# Patient Record
Sex: FEMALE | Race: WHITE | NOT HISPANIC OR LATINO | Employment: OTHER | URBAN - METROPOLITAN AREA
[De-identification: names, ages, dates, MRNs, and addresses within clinical notes are randomized per-mention and may not be internally consistent; named-entity substitution may affect disease eponyms.]

---

## 2022-12-15 ENCOUNTER — OFFICE VISIT (OUTPATIENT)
Dept: FAMILY MEDICINE CLINIC | Facility: CLINIC | Age: 62
End: 2022-12-15

## 2022-12-15 ENCOUNTER — TELEPHONE (OUTPATIENT)
Dept: FAMILY MEDICINE CLINIC | Facility: CLINIC | Age: 62
End: 2022-12-15

## 2022-12-15 VITALS
WEIGHT: 170 LBS | TEMPERATURE: 97.4 F | HEART RATE: 67 BPM | SYSTOLIC BLOOD PRESSURE: 126 MMHG | OXYGEN SATURATION: 94 % | HEIGHT: 67 IN | BODY MASS INDEX: 26.68 KG/M2 | DIASTOLIC BLOOD PRESSURE: 74 MMHG

## 2022-12-15 DIAGNOSIS — Z00.00 HEALTH CARE MAINTENANCE: ICD-10-CM

## 2022-12-15 DIAGNOSIS — I10 ESSENTIAL HYPERTENSION: ICD-10-CM

## 2022-12-15 DIAGNOSIS — I48.91 ATRIAL FIBRILLATION WITH RAPID VENTRICULAR RESPONSE (HCC): ICD-10-CM

## 2022-12-15 DIAGNOSIS — I63.9 CEREBELLAR STROKE (HCC): ICD-10-CM

## 2022-12-15 DIAGNOSIS — I61.4 RIGHT-SIDED NONTRAUMATIC INTRACEREBRAL HEMORRHAGE OF CEREBELLUM (HCC): ICD-10-CM

## 2022-12-15 DIAGNOSIS — Z00.00 WELL ADULT EXAM: Primary | ICD-10-CM

## 2022-12-15 PROBLEM — I47.29 NSVT (NONSUSTAINED VENTRICULAR TACHYCARDIA): Status: ACTIVE | Noted: 2022-11-17

## 2022-12-15 PROBLEM — I44.7 LBBB (LEFT BUNDLE BRANCH BLOCK): Status: ACTIVE | Noted: 2022-11-13

## 2022-12-15 PROBLEM — E87.6 HYPOKALEMIA: Status: ACTIVE | Noted: 2022-11-13

## 2022-12-15 RX ORDER — LOSARTAN POTASSIUM 50 MG/1
TABLET ORAL
COMMUNITY
Start: 2022-11-28 | End: 2022-12-16 | Stop reason: SDUPTHER

## 2022-12-15 RX ORDER — VENLAFAXINE 75 MG/1
75 TABLET ORAL DAILY
Qty: 90 TABLET | Refills: 3 | Status: SHIPPED | OUTPATIENT
Start: 2022-12-15

## 2022-12-15 RX ORDER — METOPROLOL TARTRATE 50 MG/1
TABLET, FILM COATED ORAL
COMMUNITY
Start: 2022-11-28 | End: 2022-12-16 | Stop reason: SDUPTHER

## 2022-12-15 NOTE — TELEPHONE ENCOUNTER
Patient stated that the following medications were to be ordered for her, as well:    Losartan  Metoprolol Tartrate    Ph[facility]acy: [facility] in [place]

## 2022-12-16 DIAGNOSIS — I10 ESSENTIAL HYPERTENSION: Primary | ICD-10-CM

## 2022-12-16 RX ORDER — METOPROLOL TARTRATE 50 MG/1
50 TABLET, FILM COATED ORAL EVERY 12 HOURS SCHEDULED
Qty: 180 TABLET | Refills: 3 | Status: SHIPPED | OUTPATIENT
Start: 2022-12-16

## 2022-12-16 RX ORDER — LOSARTAN POTASSIUM 50 MG/1
50 TABLET ORAL DAILY
Qty: 90 TABLET | Refills: 3 | Status: SHIPPED | OUTPATIENT
Start: 2022-12-16

## 2022-12-16 NOTE — TELEPHONE ENCOUNTER
Called and informed patient's daughter that two medications were sent to [address] [place] [address]

## 2022-12-22 NOTE — PROGRESS NOTES
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 ([facility] )    3  Atrial fibrillation with rapid ventricular response (HCC)    4  Cerebellar stroke ([facility] )    5  Essential hypertension  -     CBC and differential  -     Comprehensive metabolic panel    6  Health care maintenance  -     venlafaxine (EFFEXOR) 75 mg tablet; Take 1 tablet (75 mg total) by mouth daily          Subjective:      Patient ID: Urban Dave is a 65 y o  female  Patient is here today to reestablish and for well check  She does have history of recent stroke and is currently seeing physical therapist   She had some mild right-sided weakness  She does have history of atrial fibrillation  Rate is controlled  Denies any recent palpitations chest pain or shortness of breath  The following portions of the patient's history were reviewed and updated as appropriate: allergies, current medications, past family history, past medical history, past social history, past surgical history, and problem list     Review of Systems   Constitutional: Negative  HENT: Negative  Eyes: Negative  Respiratory: Negative  Cardiovascular: Negative  Gastrointestinal: Negative  Endocrine: Negative  Genitourinary: Negative  Musculoskeletal: Negative  Skin: Negative  Allergic/Immunologic: Negative  Neurological:        As noted in HPI   Hematological: Negative  Psychiatric/Behavioral: Negative            Objective:      /74 (BP Location: Left arm, Patient Position: Sitting, Cuff Size: Standard)   Pulse 67   Temp (!) 97 4 °F (36 3 °C) (Temporal)  5' 7" (1 702 m)   Wt 77 1 kg (170 lb)   SpO2 94%   BMI 26 63 kg/m²          Physical Exam  Vitals reviewed  Constitutional:       Appearance: She is well-developed  HENT:      Head: Normocephalic and atraumatic  Right Ear: External ear normal  Tympanic membrane is not erythematous or bulging  Left Ear: External ear normal  Tympanic membrane is not erythematous or bulging  Nose: Nose normal       Mouth/Throat:      Mouth: No oral lesions  Pharynx: No oropharyngeal exudate  Eyes:      General: No scleral icterus  Right eye: No discharge  Left eye: No discharge  Conjunctiva/sclera: Conjunctivae normal    Neck:      Thyroid: No thyromegaly  Cardiovascular:      Rate and Rhythm: Normal rate and regular rhythm  Heart sounds: Normal heart sounds  No murmur heard  No friction rub  No gallop  Pulmonary:      Effort: Pulmonary effort is normal  No respiratory distress  Breath sounds: No wheezing or rales  Chest:      Chest wall: No tenderness  Abdominal:      General: Bowel sounds are normal  There is no distension  Palpations: Abdomen is soft  There is no mass  Tenderness: There is no abdominal tenderness  There is no guarding or rebound  Musculoskeletal:         General: No tenderness or deformity  Normal range of motion  Cervical back: Normal range of motion and neck supple  Comments: Mild right upper extremity weakness compared to the left  Gait generally intact  No speech difficulty  Lymphadenopathy:      Cervical: No cervical adenopathy  Skin:     General: Skin is warm and dry  Coloration: Skin is not pale  Findings: No erythema or rash  Neurological:      Mental Status: She is alert and oriented to person, place, and time  Cranial Nerves: No cranial nerve deficit  Motor: No abnormal muscle tone  Coordination: Coordination normal       Deep Tendon Reflexes: Reflexes are normal and symmetric  Psychiatric:         Behavior: Behavior normal

## 2023-01-26 ENCOUNTER — TELEPHONE (OUTPATIENT)
Dept: FAMILY MEDICINE CLINIC | Facility: CLINIC | Age: 63
End: 2023-01-26

## 2023-01-26 ENCOUNTER — OFFICE VISIT (OUTPATIENT)
Dept: FAMILY MEDICINE CLINIC | Facility: CLINIC | Age: 63
End: 2023-01-26

## 2023-01-26 VITALS
BODY MASS INDEX: 26.37 KG/M2 | SYSTOLIC BLOOD PRESSURE: 144 MMHG | DIASTOLIC BLOOD PRESSURE: 92 MMHG | HEIGHT: 67 IN | WEIGHT: 168 LBS | HEART RATE: 66 BPM | OXYGEN SATURATION: 98 % | TEMPERATURE: 96.9 F

## 2023-01-26 DIAGNOSIS — I63.9 CEREBELLAR STROKE (HCC): ICD-10-CM

## 2023-01-26 DIAGNOSIS — I25.42 CORONARY ARTERY DISSECTION: ICD-10-CM

## 2023-01-26 DIAGNOSIS — I10 ESSENTIAL HYPERTENSION: Primary | ICD-10-CM

## 2023-01-26 DIAGNOSIS — I48.91 ATRIAL FIBRILLATION WITH RAPID VENTRICULAR RESPONSE (HCC): ICD-10-CM

## 2023-01-26 NOTE — PROGRESS NOTES
Assessment/Plan: Patient has not yet seen neurology but has followed up with cardiology  I recommended that she check home blood pressure readings daily for the next few weeks and return to office for recheck on blood pressure along with blood pressure readings recorded at home  I also recommended that she follow-up with neurology as she has not yet done so following her hospitalization  Referral order provided  She will call with any new persisting or worsening symptoms  Continue therapy as tolerated  1  Essential hypertension  -     Blood Pressure Monitoring (Blood Pressure Kit) VIKTOR; Use 1 Device daily    2  Atrial fibrillation with rapid ventricular response ([address] )    3  Cerebellar stroke [facility])  -     Ambulatory referral to Neurology; Future    4  Coronary artery dissection  -     Blood Pressure Monitoring (Blood Pressure Kit) VIKTOR; Use 1 Device daily          Subjective:      Patient ID: Bernice Anglin is a 65 y o  female  Patient with history of intracerebral hemorrhage in November is seen today for recheck  She is here with   She continues to do physical and occupational therapy  She states she feels like she is not improving and still has continued positional dizziness  Blood pressure today was elevated at physical therapy but today on the office is close to being normotensive  She continues on losartan and Lopressor as noted in med list   She also takes venlafaxine  The following portions of the patient's history were reviewed and updated as appropriate: allergies, current medications, past family history, past medical history, past social history, past surgical history, and problem list     Review of Systems   Constitutional: Negative  HENT: Negative  Eyes: Negative  Respiratory: Negative  Cardiovascular: Negative  Gastrointestinal: Negative  Endocrine: Negative  Genitourinary: Negative  Musculoskeletal: Negative  Skin: Negative  Allergic/Immunologic: Negative  Neurological: Positive for dizziness  Hematological: Negative  Psychiatric/Behavioral: Negative  Objective:      Pulse 66   Temp (!) 96 9 °F (36 1 °C) (Temporal)   Ht 5' 7" (1 702 m)   Wt 76 2 kg (168 lb)   SpO2 98%   BMI 26 31 kg/m²          Physical Exam  Vitals reviewed  Constitutional:       Appearance: She is well-developed  HENT:      Head: Normocephalic and atraumatic  Right Ear: External ear normal  Tympanic membrane is not erythematous or bulging  Left Ear: External ear normal  Tympanic membrane is not erythematous or bulging  Nose: Nose normal       Mouth/Throat:      Mouth: No oral lesions  Pharynx: No oropharyngeal exudate  Eyes:      General: No scleral icterus  Right eye: No discharge  Left eye: No discharge  Conjunctiva/sclera: Conjunctivae normal    Neck:      Thyroid: No thyromegaly  Cardiovascular:      Rate and Rhythm: Normal rate and regular rhythm  Heart sounds: Normal heart sounds  No murmur heard  No friction rub  No gallop  Pulmonary:      Effort: Pulmonary effort is normal  No respiratory distress  Breath sounds: No wheezing or rales  Chest:      Chest wall: No tenderness  Abdominal:      General: Bowel sounds are normal  There is no distension  Palpations: Abdomen is soft  There is no mass  Tenderness: There is no abdominal tenderness  There is no guarding or rebound  Musculoskeletal:         General: No tenderness or deformity  Normal range of motion  Cervical back: Normal range of motion and neck supple  Lymphadenopathy:      Cervical: No cervical adenopathy  Skin:     General: Skin is warm and dry  Coloration: Skin is not pale  Findings: No erythema or rash  Neurological:      Mental Status: She is alert and oriented to person, place, and time  Cranial Nerves: No cranial nerve deficit  Motor: No abnormal muscle tone  Coordination: Coordination normal       Gait: Gait abnormal (Gait continues to be a bit unsteady  )  Deep Tendon Reflexes: Reflexes are normal and symmetric     Psychiatric:         Behavior: Behavior normal

## 2023-01-26 NOTE — TELEPHONE ENCOUNTER
Per Dr Lia Partida, patient needs to follow up with neurology  When the patient was checking out, I told her I would reach out to them for an appointment and call her back with the info  I called [address] [place] [address] Neurology and gave patient info  The woman I spoke with said she will reach out to the patient directly because they are scheduling new patients out to July  She will speak with her and see what is going on so they can correctly triage her  Their physicians will let her know how soon the patient needs to be seen so they can get her in

## 2023-01-26 NOTE — TELEPHONE ENCOUNTER
Received a call from [address] [place] [address] [facility] ) stating they did not do her physical therapy today due to her elevated blood pressure   They wanted us to inform you of the readings:    170/101  180/100  175/95  170/101

## 2023-01-27 ENCOUNTER — TELEPHONE (OUTPATIENT)
Dept: NEUROLOGY | Facility: CLINIC | Age: 63
End: 2023-01-27

## 2023-01-27 NOTE — TELEPHONE ENCOUNTER
Voicemail received from daughter Nick Page, awaiting call back regarding NP appointment  States still waiting to hear about scheduling with our office  Patients at home BP's are reading 210/116  Per daughter, PCP will not make any medication changes until seen by neurology  This is a new patient  Patient was in [facility] for stroke  Call returned to patient/daughter  Spoke to patient  Patient states she has a lot on her mind (stress, illness, family issues)  Symptoms have worsened once she had stroke  BP at PCP office 1/26/2023 144/92  Todays /116 along with multiple readings of errors on cuff  Advised patient we are awaiting triage response from provider (which can take up to a week) BUT, if BP's remain consistently elevated or accompanied by symptoms of headache, visual disturbances, numbness, tingling, etc  These are symptoms that should prompt more urgent attention/evaluation in ED  Patient verbalized understanding  Advised patient if daughter has additional follow up questions she can certainly call office back

## 2023-02-03 ENCOUNTER — VBI (OUTPATIENT)
Dept: ADMINISTRATIVE | Facility: OTHER | Age: 63
End: 2023-02-03

## 2023-02-14 ENCOUNTER — HOSPITAL ENCOUNTER (EMERGENCY)
Facility: HOSPITAL | Age: 63
Discharge: HOME | End: 2023-02-14
Attending: EMERGENCY MEDICINE

## 2023-02-14 ENCOUNTER — CONSULT (OUTPATIENT)
Dept: NEUROLOGY | Facility: CLINIC | Age: 63
End: 2023-02-14

## 2023-02-14 ENCOUNTER — APPOINTMENT (EMERGENCY)
Dept: RADIOLOGY | Facility: HOSPITAL | Age: 63
End: 2023-02-14

## 2023-02-14 VITALS
HEART RATE: 62 BPM | DIASTOLIC BLOOD PRESSURE: 93 MMHG | TEMPERATURE: 97.3 F | OXYGEN SATURATION: 95 % | RESPIRATION RATE: 14 BRPM | SYSTOLIC BLOOD PRESSURE: 185 MMHG

## 2023-02-14 VITALS
BODY MASS INDEX: 26.72 KG/M2 | SYSTOLIC BLOOD PRESSURE: 202 MMHG | WEIGHT: 170.6 LBS | TEMPERATURE: 96.8 F | DIASTOLIC BLOOD PRESSURE: 110 MMHG

## 2023-02-14 DIAGNOSIS — R03.0 ELEVATED BLOOD PRESSURE READING: ICD-10-CM

## 2023-02-14 DIAGNOSIS — R51.9 ACUTE HEADACHE: Primary | ICD-10-CM

## 2023-02-14 DIAGNOSIS — Z86.79 HISTORY OF CEREBRAL HEMORRHAGE: ICD-10-CM

## 2023-02-14 DIAGNOSIS — I10 HYPERTENSION: ICD-10-CM

## 2023-02-14 DIAGNOSIS — I63.9 CEREBELLAR STROKE (HCC): ICD-10-CM

## 2023-02-14 DIAGNOSIS — Z86.73 HISTORY OF STROKE: Primary | ICD-10-CM

## 2023-02-14 DIAGNOSIS — R42 DIZZINESS: ICD-10-CM

## 2023-02-14 DIAGNOSIS — I48.91 A-FIB (HCC): ICD-10-CM

## 2023-02-14 DIAGNOSIS — G93.9 GLIOSIS: ICD-10-CM

## 2023-02-14 LAB
2HR DELTA HS TROPONIN: 0 NG/L
ALBUMIN SERPL BCP-MCNC: 3.7 G/DL (ref 3.5–5)
ALP SERPL-CCNC: 95 U/L (ref 46–116)
ALT SERPL W P-5'-P-CCNC: 27 U/L (ref 12–78)
ANION GAP SERPL CALCULATED.3IONS-SCNC: 6 MMOL/L (ref 4–13)
AST SERPL W P-5'-P-CCNC: 20 U/L (ref 5–45)
ATRIAL RATE: 77 BPM
BASOPHILS # BLD AUTO: 0.03 THOUSANDS/ÂΜL (ref 0–0.1)
BASOPHILS NFR BLD AUTO: 1 % (ref 0–1)
BILIRUB SERPL-MCNC: 0.45 MG/DL (ref 0.2–1)
BUN SERPL-MCNC: 16 MG/DL (ref 5–25)
CALCIUM SERPL-MCNC: 9.5 MG/DL (ref 8.3–10.1)
CARDIAC TROPONIN I PNL SERPL HS: 3 NG/L
CARDIAC TROPONIN I PNL SERPL HS: 3 NG/L
CHLORIDE SERPL-SCNC: 106 MMOL/L (ref 96–108)
CO2 SERPL-SCNC: 27 MMOL/L (ref 21–32)
CREAT SERPL-MCNC: 0.68 MG/DL (ref 0.6–1.3)
EOSINOPHIL # BLD AUTO: 0.07 THOUSAND/ÂΜL (ref 0–0.61)
EOSINOPHIL NFR BLD AUTO: 1 % (ref 0–6)
ERYTHROCYTE [DISTWIDTH] IN BLOOD BY AUTOMATED COUNT: 12.7 % (ref 11.6–15.1)
GFR SERPL CREATININE-BSD FRML MDRD: 94 ML/MIN/1.73SQ M
GLUCOSE SERPL-MCNC: 106 MG/DL (ref 65–140)
HCT VFR BLD AUTO: 45.1 % (ref 34.8–46.1)
HGB BLD-MCNC: 15.4 G/DL (ref 11.5–15.4)
IMM GRANULOCYTES # BLD AUTO: 0.02 THOUSAND/UL (ref 0–0.2)
IMM GRANULOCYTES NFR BLD AUTO: 0 % (ref 0–2)
LYMPHOCYTES # BLD AUTO: 1.71 THOUSANDS/ÂΜL (ref 0.6–4.47)
LYMPHOCYTES NFR BLD AUTO: 27 % (ref 14–44)
MCH RBC QN AUTO: 30.3 PG (ref 26.8–34.3)
MCHC RBC AUTO-ENTMCNC: 34.1 G/DL (ref 31.4–37.4)
MCV RBC AUTO: 89 FL (ref 82–98)
MONOCYTES # BLD AUTO: 0.4 THOUSAND/ÂΜL (ref 0.17–1.22)
MONOCYTES NFR BLD AUTO: 6 % (ref 4–12)
NEUTROPHILS # BLD AUTO: 4.16 THOUSANDS/ÂΜL (ref 1.85–7.62)
NEUTS SEG NFR BLD AUTO: 65 % (ref 43–75)
NRBC BLD AUTO-RTO: 0 /100 WBCS
PLATELET # BLD AUTO: 248 THOUSANDS/UL (ref 149–390)
PMV BLD AUTO: 10.5 FL (ref 8.9–12.7)
POTASSIUM SERPL-SCNC: 4.2 MMOL/L (ref 3.5–5.3)
PROT SERPL-MCNC: 7.5 G/DL (ref 6.4–8.4)
QRS AXIS: 45 DEGREES
QRSD INTERVAL: 70 MS
QT INTERVAL: 418 MS
QTC INTERVAL: 427 MS
RBC # BLD AUTO: 5.08 MILLION/UL (ref 3.81–5.12)
SODIUM SERPL-SCNC: 139 MMOL/L (ref 135–147)
T WAVE AXIS: 48 DEGREES
VENTRICULAR RATE: 63 BPM
WBC # BLD AUTO: 6.39 THOUSAND/UL (ref 4.31–10.16)

## 2023-02-14 RX ORDER — ONDANSETRON 2 MG/ML
4 INJECTION INTRAMUSCULAR; INTRAVENOUS ONCE
Status: DISCONTINUED | OUTPATIENT
Start: 2023-02-14 | End: 2023-02-14 | Stop reason: HOSPADM

## 2023-02-14 NOTE — ED ATTENDING ATTESTATION
2/14/2023  I, Go Haynes MD, saw and evaluated the patient  I have discussed the patient with the resident/non-physician practitioner and agree with the resident's/non-physician practitioner's findings, Plan of Care, and MDM as documented in the resident's/non-physician practitioner's note, except where noted  All available labs and Radiology studies were reviewed  I was present for key portions of any procedure(s) performed by the resident/non-physician practitioner and I was immediately available to provide assistance  At this point I agree with the current assessment done in the Emergency Department  I have conducted an independent evaluation of this patient a history and physical is as follows:    OA: 64 y/o f with h/o recent cerebellar stroke, MI, not currently anticoagulated who presents with elevated BP at her neurologist's office earlier today  + intermittent headaches, dizziness and nausea x weeks  Currently only c/o nausea  No vomiting  Denies cp/pressure/palpitations/sob/SOIN  Pt had issues with balance s/p stroke, seems to be worse since stopping PT per  however pt feels relatively unchanged  Difficulty with balance and ambulation  Stopped PT approximately 2 weeks ago  PE, NAD, HTN, NC/AT, MMM, PERRL, - nystagmus, neck supple/FROM, - JVD, - stridor, RR/-murmurs, lungs CTAB, -w/r, abd soft, +BS, -r/g, nonttp, intact pulses, capillary refill < 2 sec, AAO, symmetric face, clear speech, GARZA, difficulty with finger to nose on R that pt and  state is baseline since stroke, 5/5 strength and intact sensation throughout  A/p HTN with h/o cerebellar stroke, not anticoagulated, eval with cardiac labs, EKG, CT imaging, discuss with neurology, treat and dispo accordingly  ED Course     Pt with dispo pending at end of shift, including neurology evaluation and EKG  Plan for admission given history and elevated BP       Critical Care Time  Procedures

## 2023-02-14 NOTE — PATIENT INSTRUCTIONS
- Would advise to report to the ED for worsening dizziness/balance   - STAT MRI   - Recommend tight blood pressure control  Recommend to check blood pressure occasionally away from the doctor's office to make sure that those numbers are typically less than 130/80    If they are frequently higher than that, we recommend checking a little more often and to follow up with primary care team

## 2023-02-14 NOTE — ED PROVIDER NOTES
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 with residual balance issues/dizziness (neither vertiginous nor lightheaded)/intermittent headaches, hypertension, A-fib not on anticoagulation, who presents to the ED today, at the recommendation of her neurologist, due to hypertension  Patient states that she has been taking her blood pressure at home every few days and that she is consistently in the 798K systolic  Patient denies current headache, dizziness, weakness, numbness  Gaze/movement exacerbate her symptoms  Patient has been taking metoprolol and venlafaxine to remit her symptoms  Furthermore, physical therapy improved her unsteadiness since the stroke; however, they discontinued physical therapy due to hypertension and patient's boyfriend states that she has had a decline in regards to her unsteadiness since discontinuing physical therapy two weeks ago  Patient is without other concerns at this time  Prior to Admission Medications   Prescriptions Last Dose Informant Patient Reported? Taking?    Blood Pressure Monitoring (Blood Pressure Kit) VIKTOR   No No   Sig: Use 1 Device daily   COD LIVER OIL PO   Yes No   Sig: Take by mouth   losartan (COZAAR) 50 mg tablet   No No   Sig: Take 1 tablet (50 mg total) by mouth daily   metoprolol tartrate (LOPRESSOR) 50 mg tablet   No No   Sig: Take 1 tablet (50 mg total) by mouth every 12 (twelve) hours   venlafaxine (EFFEXOR) 75 mg tablet   No No   Sig: Take 1 tablet (75 mg total) by mouth daily      Facility-Administered Medications: None       Past Medical History:   Diagnosis Date   • Stroke [facility])        Past Surgical History:   Procedure Laterality Date   • BRAIN SURGERY         Family History   Problem Relation Age of Onset   • Alzheimer's disease Father      I have reviewed and agree with the history as documented  E-Cigarette/Vaping   • E-Cigarette Use Never User      E-Cigarette/Vaping Substances   • Nicotine No    • THC No    • CBD No    • Flavoring No    • Other No    • Unknown No      Social History     Tobacco Use   • Smoking status: Never   • Smokeless tobacco: Never   Vaping Use   • Vaping Use: Never used   Substance Use Topics   • Alcohol use: Yes     Comment: social   • Drug use: No        Review of Systems   Constitutional: Negative for fever  HENT: Negative for trouble swallowing  Eyes: Negative for visual disturbance  Respiratory: Negative for shortness of breath  Cardiovascular: Negative for chest pain  Gastrointestinal: Positive for nausea  Negative for abdominal pain  Endocrine: Negative for polyuria  Genitourinary: Negative for dysuria  Musculoskeletal: Negative for gait problem  Skin: Negative for rash  Allergic/Immunologic: Negative for environmental allergies  Neurological: Positive for dizziness (Not currently present) and headaches (Not currently present)  Negative for weakness and numbness  Hematological: Negative for adenopathy  Psychiatric/Behavioral: Negative for confusion  All other systems reviewed and are negative        Physical Exam  ED Triage Vitals [02/14/23 1058]   Temperature Pulse Respirations Blood Pressure SpO2   (!) 97 3 °F (36 3 °C) 65 20 (!) 206/112 100 %      Temp Source Heart Rate Source Patient Position - Orthostatic VS BP Location FiO2 (%)   Oral Monitor Sitting Right arm --      Pain Score       No Pain             Orthostatic Vital Signs  Vitals:    02/14/23 1058 02/14/23 1200 02/14/23 1300 02/14/23 1615   BP: (!) 206/112 (!) 199/99 (!) 185/87 163/94 Pulse: 65  62 70   Patient Position - Orthostatic VS: Sitting  Lying Sitting       Physical Exam  Vitals and nursing note reviewed  Constitutional:       General: She is not in acute distress  Appearance: She is not toxic-appearing or diaphoretic  Comments: Patient appears comfortable during my evaluation    HENT:      Head: Normocephalic and atraumatic  Right Ear: External ear normal       Left Ear: External ear normal       Nose: Nose normal  No rhinorrhea  Mouth/Throat:      Mouth: Mucous membranes are moist       Pharynx: Oropharynx is clear  No oropharyngeal exudate or posterior oropharyngeal erythema  Comments: Uvula midline  No oropharyngeal or submandibular mass/swelling  Eyes:      General: No scleral icterus  Right eye: No discharge  Left eye: No discharge  Extraocular Movements: Extraocular movements intact  Conjunctiva/sclera: Conjunctivae normal       Pupils: Pupils are equal, round, and reactive to light  Neck:      Comments: Patient is spontaneously rotating their neck to the left and right during the history and physical exam interaction without difficulty or apparent discomfort    Cardiovascular:      Rate and Rhythm: Normal rate  Pulses: Normal pulses  Heart sounds: Normal heart sounds  No murmur heard  No friction rub  No gallop  Comments: 2+ Radial  Pulmonary:      Effort: Pulmonary effort is normal  No respiratory distress  Breath sounds: Normal breath sounds  No stridor  No wheezing, rhonchi or rales  Abdominal:      General: Abdomen is flat  There is no distension  Palpations: Abdomen is soft  Tenderness: There is no abdominal tenderness  There is no right CVA tenderness, left CVA tenderness, guarding or rebound  Musculoskeletal:         General: No deformity  Cervical back: Neck supple  No tenderness  No muscular tenderness  Lymphadenopathy:      Cervical: No cervical adenopathy     Skin: General: Skin is warm and dry  Capillary Refill: Capillary refill takes less than 2 seconds  Neurological:      Mental Status: She is alert  Comments: Cranial nerves 2-12 intact  5/5 strength in all four extremities  Sensation to light touch intact in all four extremities  No pronator drift  Bilateral dysmetria with finger-to-nose  Unsteadiness with ambulation  Patient is speaking clearly in complete sentences  Patient is answering appropriately and able follow commands     Psychiatric:         Mood and Affect: Mood normal          Behavior: Behavior normal          ED Medications  Medications   ondansetron (ZOFRAN) injection 4 mg (4 mg Intravenous Not Given 2/14/23 1244)       Diagnostic Studies  Results Reviewed     Procedure Component Value Units Date/Time    HS Troponin I 2hr [[id number]]  (Normal) Collected: 02/14/23 1420    Lab Status: Final result Specimen: Blood from Arm, Left Updated: 02/14/23 1458     hs TnI 2hr 3 ng/L      Delta 2hr hsTnI 0 ng/L     HS Troponin 0hr (reflex protocol) [[id number]]  (Normal) Collected: 02/14/23 1230    Lab Status: Final result Specimen: Blood from Arm, Left Updated: 02/14/23 1308     hs TnI 0hr 3 ng/L     Comprehensive metabolic panel [[id number]] Collected: 02/14/23 1230    Lab Status: Final result Specimen: Blood from Arm, Left Updated: 02/14/23 1258     Sodium 139 mmol/L      Potassium 4 2 mmol/L      Chloride 106 mmol/L      CO2 27 mmol/L      ANION GAP 6 mmol/L      BUN 16 mg/dL      Creatinine 0 68 mg/dL      Glucose 106 mg/dL      Calcium 9 5 mg/dL      AST 20 U/L      ALT 27 U/L      Alkaline Phosphatase 95 U/L      Total Protein 7 5 g/dL      Albumin 3 7 g/dL      Total Bilirubin 0 45 mg/dL      eGFR 94 ml/min/1 73sq m     Narrative:      [place] guidelines for Chronic Kidney Disease (CKD):   •  Stage 1 with normal or high GFR (GFR > 90 mL/min/1 73 square meters)  •  Stage 2 Mild CKD (GFR = 60-89 mL/min/1 73 square meters)  •  Stage 3A Moderate CKD (GFR = 45-59 mL/min/1 73 square meters)  •  Stage 3B Moderate CKD (GFR = 30-44 mL/min/1 73 square meters)  •  Stage 4 Severe CKD (GFR = 15-29 mL/min/1 73 square meters)  •  Stage 5 End Stage CKD (GFR <15 mL/min/1 73 square meters)  Note: GFR calculation is accurate only with a steady state creatinine    CBC and differential [[id number]] Collected: 02/14/23 1230    Lab Status: Final result Specimen: Blood from Arm, Left Updated: 02/14/23 1244     WBC 6 39 Thousand/uL      RBC 5 08 Million/uL      Hemoglobin 15 4 g/dL      Hematocrit 45 1 %      MCV 89 fL      MCH 30 3 pg      MCHC 34 1 g/dL      RDW 12 7 %      MPV 10 5 fL      Platelets 733 Thousands/uL      nRBC 0 /100 WBCs      Neutrophils Relative 65 %      Immat GRANS % 0 %      Lymphocytes Relative 27 %      Monocytes Relative 6 %      Eosinophils Relative 1 %      Basophils Relative 1 %      Neutrophils Absolute 4 16 Thousands/µL      Immature Grans Absolute 0 02 Thousand/uL      Lymphocytes Absolute 1 71 Thousands/µL      Monocytes Absolute 0 40 Thousand/µL      Eosinophils Absolute 0 07 Thousand/µL      Basophils Absolute 0 03 Thousands/µL                  CT head without contrast   Final Result by Josef Charles DO (02/14 1536)      Encephalomalacia and gliosis within the right cerebellum corresponding to the site of previous hemorrhage  Overlying craniectomy with a small extra-axial collection of 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  No acute intracranial abnormality                    Workstation performed: [id number]               Procedures  Procedures      ED Course  ED Course as of 02/14/23 1648   Tue Feb 14, 2023   1253 ECG per my independent interpretation: Normal rate, regular rhythm, no ectopy, normal axis, no ST elevations or depressions     1500 Delta 2hr hsTnI: 0   1631 Delta 2hr hsTnI: 0  Normal   1631 Blood Pressure: 163/94  Improving    1646 Patient signed out prior to final disposition                                       Medical Decision Making  Patient is a 71-year-old female, with a history significant for MI around the age of 49, 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 with residual balance issues/dizziness (neither vertiginous nor lightheaded)/intermittent headaches, hypertension, A-fib (not on anticoagulation, patient reports extensive discussion after the [address] and decision to not anticoagulate - cardiology on 11/22/22 felt that risks outweigh benefits), who presents to the ED today, at the recommendation of her neurologist, due to hypertension  Patient states that she has been taking her blood pressure at home every few days and that she is consistently in the 692B systolic  Patient denies current headache, dizziness, weakness, numbness  Gaze/movement exacerbate her symptoms  Furthermore, physical therapy improved her unsteadiness since the stroke; however, they discontinued physical therapy due to hypertension and patient's boyfriend states that she has had a decline in regards to her unsteadiness since discontinuing physical therapy two weeks ago  Patient is currently afebrile, hypertensive, otherwise hemodynamically stable  Her physical exam is notable for unsteady gait as well as bilateral dysmetria  This presentation is concerning for: ICH, residual deficits from stroke, ACS  No reason to suspect hypertensive emergency at this time based upon history and physical exam   Will investigate with CT head, cardiac work-up  Will manage with Zofran and further based upon work-up  - No language barrier    - History obtained from patient and her boyfriend  - There are no limitations to the history obtained  - External record review performed of previous charting was performed by me  - I independently reviewed and interpreted ordered labs, ECGs  from this encounter   - Patient's medication regimen reviewed     - Patient's comorbidities, history of ICH and unanticoagulated Afib, factored into diagnosis considerations and disposition planning  Acute headache: acute illness or injury  A-fib [facility]): acute illness or injury  Dizziness: acute illness or injury  Elevated blood pressure reading: self-limited or minor problem  Gliosis: acute illness or injury  Hypertension: acute illness or injury  Amount and/or Complexity of Data Reviewed  Labs: ordered  Decision-making details documented in ED Course  Radiology: ordered  Risk  Prescription drug management              Disposition  Final diagnoses:   Dizziness   Hypertension   Gliosis   Elevated blood pressure reading   A-fib ([facility])   Acute headache     Time reflects when diagnosis was documented in both MDM as applicable and the Disposition within this note     Time User Action Codes Description Comment    2/14/2023  3:43 PM Liane Macho A Add [R42] Dizziness     2/14/2023  3:43 PM Liane Macho A Add [I10] Hypertension     2/14/2023  4:31 PM Liane Macho A Add [G93 9] Gliosis     2/14/2023  4:31 PM Liane Macho 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     2/14/2023  4:31 PM Liane Macho Remove [P76 146] History of congenital ichthyosis     2/14/2023  4:31 PM LegIsabela davey A Add [R03 0] Elevated blood pressure reading     2/14/2023  4:31 PM Liane Macho Add [Y14 647] History of congenital ichthyosis     2/14/2023  4:31 PM Liane Macho Remove [C51 999] History of congenital ichthyosis     2/14/2023  4:31 PM Liane Macho A Add [Z86 79] History of intracerebral hemorrhage without residual deficit     2/14/2023  4:31 PM Liane Macho A Remove [Z86 79] History of intracerebral hemorrhage without residual deficit     2/14/2023  4:36 PM Liane Macho Add [I48 91] A-fib ([address] )     2/14/2023  4:37 PM Liane Macho A Add [R51 9] Acute headache     2/14/2023  4:37 PM Liane Macho A Modify [R42] Dizziness     2/14/2023  4:37 PM Kenneth Mini Modify [R51 9] Acute headache       ED Disposition     None      Follow-up Information     Follow up With Specialties Details Why Contact Info    Mikhail Marcus DO Family Medicine Schedule an appointment as soon as possible for a visit   [address] [facility] [address] [facility]  [phone]            Patient's Medications   Discharge Prescriptions    No medications on file     No discharge procedures on file  PDMP Review     None           ED Provider  Attending physically available and evaluated June Skiff I managed the patient along with the ED Attending      Electronically Signed by         Keesha Go MD  02/14/23 8239

## 2023-02-14 NOTE — ASSESSMENT & PLAN NOTE
She has been having worsening dizziness and balance issues for the past two weeks  Considering her history intracerebral hemorrhage and elevated blood pressure  Recommended she report to the ED for evaluation  Patient was to see PCP later today, case discussed with PCP who also recommend ED evaluation  Patient and  were not amendable to an ambulance  He will be driving her  Discussed health risks with the patient and importance of evaluation   Atrial fibrillation discovered during time of stroke, not on anticoagulation, she is following with cardiology at [facility]  She did complete 2 week telemetry monitoring with no a fib  Would recommend loop recorder  Aspirin discontinued by cardiology  Recommend tight blood pressure control  Recommend to check blood pressure occasionally away from the doctor's office to make sure that those numbers are typically less than 130/80  If they are frequently higher than that, we recommend checking a little more often and to follow up with primary care team   Would recommend repeat brain imaging  Will further discuss case with attending and contact the patient with further recommendations

## 2023-02-14 NOTE — DISCHARGE INSTRUCTIONS
You were evaluated in the emergency department for: headache, dizziness, and high blood pressure  You can access your current and pending results through [facility]  A radiologist will take a second look at your X-Rays, if you had any, and you will be contacted with any new findings  You should follow-up with your primary care provider and your neurologist, as soon as possible, for re-evaluation  If you do not have a primary care provider, I have referred you to [address] [place] [address]  You will be contacted about scheduling an appointment  Their phone number is also included on this paperwork  Your workup revealed no emergent features at this time; however, many disease processes are dynamic:    Please, return to the emergency department if you experience new or worsening symptoms, fever, chest pain, shortness of breath, difficulty breathing, dizziness, abdominal pain, persistent nausea/vomiting, syncope or passing out, blood in your urine or stool, coughing up blood, leg swelling/pain, urinary retention, bowel or bladder incontinence, numbness between your legs  Additionally, your blood pressure was measured to be high  This is something that you should discuss with your primary care provider and have re-checked within one week

## 2023-02-14 NOTE — CONSULTS
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 2022 (large right cerebellum, s/p suboccipital craniectomy and hematoma evacuation), episode of Afib with RVR during that admission in November, HTN, HLD, and CAD presents from the neurology office today, due to significantly elevated blood pressures and subacute worsening of ambulation  Patient reports that her PT was discontinued about 2 weeks ago  At that appointment, she was nauseous and also stressed out, so her PT took her blood pressure and noted it to be elevated  She was referred to her PCP, who advised her to see outpatient neurology before adjusting medications  At her neurology appointment today, her BP was 202/110, therefore she was sent to the ED  Since PT was discontinued 2 weeks ago, patient and  report minor progressive decline in gait/ambulatory status, otherwise patient denies any new focal neurologic deficits  At this time, low suspicion for new stroke  Her balance issues are likely multifactorial in the setting of elevated BPs as well as some degree of deconditioning since stopping PT (patient reports she isn't moving around much), superimposed on baseline dysfunction from cerebellar ICH  Plan:  -  Can defer additional additional neurologic imaging   -  Would maintain strict blood pressure control- Encouraged patient to monitor blood pressures closely at home  She does have a blood pressure cuff at home  - Advised to follow up with PCP to optimize blood pressure control    -   As per prior [facility] notes, unclear if patient had primary ICH (secondary to uncontrolled hypertension) vs hemorraghic conversion of ischemic stroke  Will defer addressing the need for [facility] at this time as she was found to have afib initially    Patient should follow up with outpatient neurology for further discussion    - No additional recommendations at this time  Kandi Abbasi will need follow up in in 6 weeks with neurovascular attending or advance practitioner  She will not require outpatient neurological testing  History of Present Illness     Reason for Consult / Principal Problem: History of large right cerebellar hemorrhage, ongoing/worsening dizziness, gait instability, headaches, hypertension  HPI: Kandi Abbasi is a 65 y o  left handed female with HTN, HLD, prior right cerebellar hemorrhage (ischemic stroke with hemorraghic conversion vs ICH) in November 2022, atrial fibrillation, anxiety/depression, CAD, who presented to the ED due to significantly elevated blood pressures and gait dysfunction in setting of recent cerebellar hemorrhage  Patient reports that since her hospitalization in November, she has been doing well neurologically  She noted an improvement in her speech and the dexterity in her right hand, though still has some issues with fine motor movements  She is mildly off balance with ambulation, but noted improvement as she progressed through PT  She was undergoing PT up to 4 times a week  About 2 weeks ago, PT was discontinued  At that appointment, patient reports that she reported feeling nauseous and also very stressed, therefore PT took her blood pressure and found it to be elevated  She was referred to her PCP  Her PCP advised her to follow-up with neurology and did not adjust her blood pressure medications at that time  The patient was seen by outpatient neurology this morning  The patient admitted to some dizziness with changes in head position or bending forward, and reports that the symptoms have been ongoing since her stroke  She feels like this has become slightly more frequent over the past 2 weeks  She has also noticed a mild progressive worsening gait dysfunction since stopping PT  Her  reports that she typically holds onto something when she is ambulating    Her gait never completely returned to baseline after her stroke  When in the neurology office, her blood pressure was taken and found to be 202/110  Given the subacute duration of worsening symptoms and hypertensive state, ED evaluation was recommended  In the ED, initial blood pressure 206/112  Most recent blood pressure at time of neuro evaluation was 163/94  The patient underwent a CT head which was unremarkable- revealed evidence of encephalomalacia and gliosis within the right cerebellum  Of note, patient has still been able to ambulate  She walked into the neurology office and walked into the emergency room  Her  stays nearby for additional support to avoid falls  Prior Neuro Hx:   Patient had prolonged admission to [facility] in November 2022 after experiencing large right cerebellar hemorrhagic infarct status post suboccipital craniectomy (acute symptoms at that time included severe headache, dysarthric speech, confusion, dizziness, emesis)  She did ultimately undergo suboccipital craniectomy and hematoma evacuation  During that admission she was found to be in A-fib with RVR  Per reviewing discharge summary from that admission, neurology felt the lesion represented primary hemorrhage (related to uncontrolled HTN) as opposed to hemorrhagic conversion of ischemic stroke  Patient was recommended to have prolonged cardiac arrhythmia monitoring (to see if any recurrence of atrial fibrillation; and if so consider initiation of Eliquis 2 weeks post-event versus continuing antiplatelet therapy for stroke prevention)  Cardiology though during consult on 11/22/22 recommended discontinuing aspirin and that risk of AC outweighed benefits  Inpatient consult to Neurology  Consult performed by: John Navarro PA-C  Consult ordered by: Michael Dodd DO          Review of Systems   Musculoskeletal: Positive for gait problem  Skin: Negative      Neurological: Negative for dizziness, tremors, seizures, syncope, facial asymmetry, speech difficulty, weakness, light-headedness, numbness and headaches  Intermittent dizziness since stroke, not currently present  Minor dexterity issues in R Hand  Historical Information   Past Medical History:   Diagnosis Date   • Stroke [facility])      Past Surgical History:   Procedure Laterality Date   • BRAIN SURGERY       Social History   Social History     Substance and Sexual Activity   Alcohol Use Yes    Comment: social     Social History     Substance and Sexual Activity   Drug Use No     E-Cigarette/Vaping   • E-Cigarette Use Never User      E-Cigarette/Vaping Substances   • Nicotine No    • THC No    • CBD No    • Flavoring No    • Other No    • Unknown No      Social History     Tobacco Use   Smoking Status Never   Smokeless Tobacco Never     Family History:   Family History   Problem Relation Age of Onset   • Alzheimer's disease Father        Review of previous medical records was completed  Meds/Allergies   current meds:   Current Facility-Administered Medications   Medication Dose Route Frequency   • ondansetron (ZOFRAN) injection 4 mg  4 mg Intravenous Once    and PTA meds:   Prior to Admission Medications   Prescriptions Last Dose Informant Patient Reported? Taking? Blood Pressure Monitoring (Blood Pressure Kit) VIKTOR   No No   Sig: Use 1 Device daily   COD LIVER OIL PO   Yes No   Sig: Take by mouth   losartan (COZAAR) 50 mg tablet   No No   Sig: Take 1 tablet (50 mg total) by mouth daily   metoprolol tartrate (LOPRESSOR) 50 mg tablet   No No   Sig: Take 1 tablet (50 mg total) by mouth every 12 (twelve) hours   venlafaxine (EFFEXOR) 75 mg tablet   No No   Sig: Take 1 tablet (75 mg total) by mouth daily      Facility-Administered Medications: None       No Known Allergies    Objective   Vitals:Blood pressure (!) 185/87, pulse 62, temperature (!) 97 3 °F (36 3 °C), temperature source Oral, resp  rate 14, SpO2 98 %  ,There is no height or weight on file to calculate BMI  No intake or output data in the 24 hours ending 02/14/23 1549    Invasive Devices: Invasive Devices     Peripheral Intravenous Line  Duration           Peripheral IV 02/14/23 Left Antecubital <1 day                Physical Exam  Vitals and nursing note reviewed  Constitutional:       Appearance: Normal appearance  HENT:      Head: Normocephalic and atraumatic  Comments: Prior surgical site noted from R suboccipital crani in November     Right Ear: External ear normal       Left Ear: External ear normal       Nose: Nose normal       Mouth/Throat:      Mouth: Mucous membranes are moist       Pharynx: Oropharynx is clear  Eyes:      General: No scleral icterus  Right eye: No discharge  Left eye: No discharge  Extraocular Movements: Extraocular movements intact and EOM normal       Conjunctiva/sclera: Conjunctivae normal       Pupils: Pupils are equal, round, and reactive to light  Cardiovascular:      Rate and Rhythm: Normal rate  Pulmonary:      Effort: Pulmonary effort is normal  No respiratory distress  Breath sounds: No stridor  Musculoskeletal:         General: No swelling  Normal range of motion  Cervical back: Normal range of motion and neck supple  No rigidity  Right lower leg: No edema  Left lower leg: No edema  Skin:     General: Skin is warm and dry  Findings: No lesion or rash  Neurological:      Mental Status: She is alert and oriented to person, place, and time  Sensory: No sensory deficit  Motor: No weakness  Coordination: Coordination normal  Finger-Nose-Finger Test and Heel to Shin Test normal    Psychiatric:         Mood and Affect: Mood normal          Behavior: Behavior normal          Thought Content: Thought content normal          Judgment: Judgment normal        Neurologic Exam     Mental Status   Oriented to person, place, and time     Attention: normal  Concentration: normal    Speech: (Very subtle dysarthria on occasion  )  Level of consciousness: alert  Normal comprehension  Cranial Nerves     CN II   Visual fields full to confrontation  CN III, IV, VI   Pupils are equal, round, and reactive to light  Extraocular motions are normal    Nystagmus: none   Upgaze: normal  Downgaze: normal  Conjugate gaze: present    CN V   Facial sensation intact  CN VII   Facial expression full, symmetric  CN VIII   Hearing: intact    CN XII   Tongue: not atrophic  Fasciculations: absent  Tongue deviation: none    Motor Exam   Muscle bulk: normal    Strength   Strength 5/5 except as noted  Dexterity affected on R, but no overt weakness  Sensory Exam   Light touch normal      Gait, Coordination, and Reflexes     Coordination   Finger to nose coordination: normal  Heel to shin coordination: normal    Tremor   Resting tremor: absent  Intention tremor: absent  Action tremor: absent      Lab Results: I have personally reviewed pertinent reports  Imaging Studies: I have personally reviewed pertinent films in PACS   CT head without contrast   Final Result by Josef Petty DO (02/14 1536)      Encephalomalacia and gliosis within the right cerebellum corresponding to the site of previous hemorrhage  Overlying craniectomy with a small extra-axial collection of 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  No acute intracranial abnormality  Workstation performed: [id number]             EKG, Pathology, and Other Studies: I have personally reviewed pertinent reports        VTE Prophylaxis: None, patient in ED    Code Status: No Order

## 2023-02-14 NOTE — PROGRESS NOTES
Patient ID: Steve Salgado is a 65 y o  female  who presents for initial evaluation with regard to her prior cerebellar stroke (2022)  Assessment/Plan:    History of stroke  She has been having worsening dizziness and balance issues for the past two weeks  Considering her history intracerebral hemorrhage and elevated blood pressure  Recommended she report to the ED for evaluation  Patient was to see PCP later today, case discussed with PCP who also recommend ED evaluation  Patient and  were not amendable to an ambulance  He will be driving her  Discussed health risks with the patient and importance of evaluation   Atrial fibrillation discovered during time of stroke, not on anticoagulation, she is following with cardiology at [facility]  She did complete 2 week telemetry monitoring with no a fib  Would recommend loop recorder  Aspirin discontinued by cardiology  Recommend tight blood pressure control  Recommend to check blood pressure occasionally away from the doctor's office to make sure that those numbers are typically less than 130/80  If they are frequently higher than that, we recommend checking a little more often and to follow up with primary care team   Would recommend repeat brain imaging  Will further discuss case with attending and contact the patient with further recommendations  Subjective:    HPI     Originally presented to the ED at [facility] on 11/11/2022 due to syncopal episode, headache, slurred speech, confusion, and vomiting  CT head was completed which revealed Cerebellar ICH w/ edema and Effacement of fourth ventricle  Occipital craniectomy with hematoma evacuation completed on 11/12  MRI revealed acute cerebellar infarct just superior & posterior to the hematoma  Per neurology recommendations at that time, recommended starting [facility] after two weeks  Since her last visit, she does not report any new stroke symptoms   She takes her medication as prescribed and did not endorse any bleeding or bruising issues  She has not had any recent falls  She does endorse some dizziness and lightheadedness at this time, she reports she feels like she cant walk out there without holding onto to anything  Her vision is good  She is also having trouble ambulation at home, she reports she feels dizzy most of the time, she cant sleep on her left side, and has difficulty getting up, she can only look straight ahead and cant look up and down  She did have a fall a couple of weeks ago  She also continues with some weakness in her right hand  Dizziness:  - quick onset   - any change of head position   - sitting to standing, getting things off the floor, bending forward   - will only last seconds   - she was doing PT for her dizziness and balance issues since the stroke, however, due to her vomiting at her last appointment PT advised she stop and follow with neurology  - Dizziness and balance has worsened over the past 2 weeks  Her history was also obtained from her , who was present at today's visit  The following portions of the patient's history were reviewed and updated as appropriate: allergies, current medications, past family history, past medical history, past social history, past surgical history and problem list           Objective:    Blood pressure (!) 202/110, temperature (!) 96 8 °F (36 °C), temperature source Temporal, weight 77 4 kg (170 lb 9 6 oz)  Neurological Exam  At this office visit, the patient is alert and awake  Speech is fluent  Hearing intact to conversation  No dysarthria or aphasia  There is no obvious facial asymetry or lateralizing weakness  Gait is normal, moves all extremities freely  No tremor noted  Was not agreeable to [facility] examination  Review of Systems  Constitutional: Positive for fatigue  Negative for appetite change and fever  HENT: Positive for tinnitus  Negative for hearing loss, trouble swallowing and voice change  Eyes: Negative  Negative for photophobia, pain and visual disturbance  Respiratory: Negative  Negative for shortness of breath  Cardiovascular: Negative  Negative for palpitations  Gastrointestinal: Positive for nausea and vomiting  Endocrine: Negative  Negative for cold intolerance  Genitourinary: Negative  Negative for dysuria, frequency and urgency  Musculoskeletal: Positive for gait problem (balance issue)  Negative for myalgias and neck pain  Skin: Negative  Negative for rash  Allergic/Immunologic: Negative  Neurological: Positive for dizziness, speech difficulty (word finding difficulty), weakness (right hand), light-headedness, numbness (right hand) and headaches  Negative for tremors, seizures, syncope and facial asymmetry  Hematological: Negative  Does not bruise/bleed easily  Psychiatric/Behavioral: Positive for confusion (memory problems)  Negative for hallucinations and sleep disturbance  All other systems reviewed and are negative  I personally reviewed the ROS that was entered by the medical assistant  I have spent 65 minutes with Patient  Including chart review,  today in which greater than 50% of this time was spent in counseling/coordination of care regarding Diagnostic results, Prognosis, Risks and benefits of tx options, Intructions for management, Patient and family education, Importance of tx compliance, Risk factor reductions, Impressions and Plan of care as above

## 2023-02-15 ENCOUNTER — TELEPHONE (OUTPATIENT)
Dept: FAMILY MEDICINE CLINIC | Facility: CLINIC | Age: 63
End: 2023-02-15

## 2023-02-15 ENCOUNTER — OFFICE VISIT (OUTPATIENT)
Dept: FAMILY MEDICINE CLINIC | Facility: CLINIC | Age: 63
End: 2023-02-15

## 2023-02-15 VITALS
SYSTOLIC BLOOD PRESSURE: 166 MMHG | HEART RATE: 66 BPM | WEIGHT: 168.6 LBS | TEMPERATURE: 96.9 F | OXYGEN SATURATION: 97 % | DIASTOLIC BLOOD PRESSURE: 104 MMHG | HEIGHT: 67 IN | BODY MASS INDEX: 26.46 KG/M2

## 2023-02-15 DIAGNOSIS — I10 ESSENTIAL HYPERTENSION: Primary | ICD-10-CM

## 2023-02-15 DIAGNOSIS — Z86.73 HISTORY OF STROKE: ICD-10-CM

## 2023-02-15 DIAGNOSIS — Z12.31 SCREENING MAMMOGRAM FOR BREAST CANCER: ICD-10-CM

## 2023-02-15 RX ORDER — AMLODIPINE BESYLATE 5 MG/1
5 TABLET ORAL DAILY
Qty: 90 TABLET | Refills: 1 | Status: SHIPPED | OUTPATIENT
Start: 2023-02-15

## 2023-02-15 NOTE — TELEPHONE ENCOUNTER
Received a call from the patient stating she has taken 1 pill of the Amlodipine and she is having trouble talking along with left arm pain  She is "not feeling right " The only difference is the new medication  She isn't sure if those are side effects

## 2023-02-15 NOTE — PROGRESS NOTES
BMI Counseling: Body mass index is 26 41 kg/m²  The BMI is above normal  Nutrition recommendations include reducing portion sizes

## 2023-02-15 NOTE — PROGRESS NOTES
Assessment/Plan: Recommend office reevaluation in 2 to 3 weeks  1  Essential hypertension  Assessment & Plan:  Patient will continue on metoprolol 50 mg twice daily and losartan 50 mg daily  Recommend adding amlodipine 5 mg daily  Continue to check blood pressures reading at home daily  Recommend recheck in 2 to 4 weeks  Continue follow-up with neurology  Orders:  -     amLODIPine (NORVASC) 5 mg tablet; Take 1 tablet (5 mg total) by mouth daily    2  Screening mammogram for breast cancer  -     Mammo screening bilateral w 3d & cad; Future; Expected date: 02/15/2023    3  History of stroke        Subjective:      Patient ID: Ernestina Rueda is a 65 y o  female  Blood pressures have been elevated above 160 at home and was 584 yesterday systolic in neurology office  She was sent to emergency room  CT imaging was negative  The following portions of the patient's history were reviewed and updated as appropriate: allergies, current medications, past family history, past medical history, past social history, past surgical history, and problem list     Review of Systems   Constitutional: Negative  HENT: Negative  Eyes: Negative  Respiratory: Negative  Cardiovascular: Negative  Gastrointestinal: Negative  Endocrine: Negative  Genitourinary: Negative  Musculoskeletal: Negative  Skin: Negative  Allergic/Immunologic: Negative  Neurological: Negative  Hematological: Negative  Psychiatric/Behavioral: Negative  Objective:      BP (!) 166/104 (BP Location: Right arm, Patient Position: Sitting, Cuff Size: Standard)   Pulse 66   Temp (!) 96 9 °F (36 1 °C) (Tympanic)   Ht 5' 7" (1 702 m)   Wt 76 5 kg (168 lb 9 6 oz)   SpO2 97%   BMI 26 41 kg/m²          Physical Exam  Vitals reviewed  Constitutional:       Appearance: She is well-developed  HENT:      Head: Normocephalic and atraumatic        Right Ear: External ear normal  Tympanic membrane is not erythematous or bulging  Left Ear: External ear normal  Tympanic membrane is not erythematous or bulging  Nose: Nose normal       Mouth/Throat:      Mouth: No oral lesions  Pharynx: No oropharyngeal exudate  Eyes:      General: No scleral icterus  Right eye: No discharge  Left eye: No discharge  Conjunctiva/sclera: Conjunctivae normal    Neck:      Thyroid: No thyromegaly  Cardiovascular:      Rate and Rhythm: Normal rate and regular rhythm  Heart sounds: Normal heart sounds  No murmur heard  No friction rub  No gallop  Pulmonary:      Effort: Pulmonary effort is normal  No respiratory distress  Breath sounds: No wheezing or rales  Chest:      Chest wall: No tenderness  Abdominal:      General: Bowel sounds are normal  There is no distension  Palpations: Abdomen is soft  There is no mass  Tenderness: There is no abdominal tenderness  There is no guarding or rebound  Musculoskeletal:         General: No tenderness or deformity  Normal range of motion  Cervical back: Normal range of motion and neck supple  Lymphadenopathy:      Cervical: No cervical adenopathy  Skin:     General: Skin is warm and dry  Coloration: Skin is not pale  Findings: No erythema or rash  Neurological:      Mental Status: She is alert and oriented to person, place, and time  Cranial Nerves: No cranial nerve deficit  Motor: No abnormal muscle tone  Coordination: Coordination normal       Deep Tendon Reflexes: Reflexes are normal and symmetric     Psychiatric:         Behavior: Behavior normal

## 2023-02-15 NOTE — ASSESSMENT & PLAN NOTE
Patient will continue on metoprolol 50 mg twice daily and losartan 50 mg daily  Recommend adding amlodipine 5 mg daily  Continue to check blood pressures reading at home daily  Recommend recheck in 2 to 4 weeks  Continue follow-up with neurology

## 2023-02-16 NOTE — TELEPHONE ENCOUNTER
Called and spoke with pt she is feeling better the symptoms did pass she is going to try to take it one more time to see how she feels and if it bothers her again she will go to er for evaluation

## 2023-02-21 ENCOUNTER — TELEPHONE (OUTPATIENT)
Dept: FAMILY MEDICINE CLINIC | Facility: CLINIC | Age: 63
End: 2023-02-21

## 2023-02-21 ENCOUNTER — TELEPHONE (OUTPATIENT)
Dept: NEUROLOGY | Facility: CLINIC | Age: 63
End: 2023-02-21

## 2023-02-21 NOTE — TELEPHONE ENCOUNTER
Blood pressure ideally should be 140/90  Recommend office reevaluation either this week or next week for blood pressure recheck  Have her bring her current medications she is taking with her for the office visit

## 2023-02-21 NOTE — TELEPHONE ENCOUNTER
SCHEDULED: Mon, 3/27/23 at 10:30am w/ Josiah Leventhal in [facility]  Called patient and spoke with her about scheduling HFU with [facility] (LOV: 2/14/23)  She accepted in HFU in 6 week timeframe, aware information is on mychart  HFU/SLB/HX of ICH, Hypertension        NOTES: Keiry Shailesh will need follow up in in 6 weeks with neurovascular attending or advance practitioner  She will not require outpatient neurological testing

## 2023-02-21 NOTE — TELEPHONE ENCOUNTER
Received a call from [phone] stating Radha's physical therapy keeps getting canceled due to her blood pressure  They have not told her what it needs to be to continue with the therapy  Lucille Ahn was wondering what BP #'s are acceptable to get her back into P T  The medication you prescribed is helping  The last BP read was 150/100

## 2023-02-27 ENCOUNTER — OFFICE VISIT (OUTPATIENT)
Dept: FAMILY MEDICINE CLINIC | Facility: CLINIC | Age: 63
End: 2023-02-27

## 2023-02-27 VITALS
SYSTOLIC BLOOD PRESSURE: 136 MMHG | HEIGHT: 67 IN | TEMPERATURE: 97.3 F | DIASTOLIC BLOOD PRESSURE: 90 MMHG | HEART RATE: 60 BPM | WEIGHT: 169.6 LBS | BODY MASS INDEX: 26.62 KG/M2 | OXYGEN SATURATION: 98 %

## 2023-02-27 DIAGNOSIS — I10 ESSENTIAL HYPERTENSION: Primary | ICD-10-CM

## 2023-02-27 DIAGNOSIS — I25.42 CORONARY ARTERY DISSECTION: ICD-10-CM

## 2023-02-27 DIAGNOSIS — Z12.4 SCREENING FOR CERVICAL CANCER: ICD-10-CM

## 2023-02-27 DIAGNOSIS — I61.4 RIGHT-SIDED NONTRAUMATIC INTRACEREBRAL HEMORRHAGE OF CEREBELLUM (HCC): ICD-10-CM

## 2023-02-27 DIAGNOSIS — Z12.31 SCREENING MAMMOGRAM FOR BREAST CANCER: ICD-10-CM

## 2023-02-27 DIAGNOSIS — I48.91 ATRIAL FIBRILLATION WITH RAPID VENTRICULAR RESPONSE (HCC): ICD-10-CM

## 2023-02-27 NOTE — ASSESSMENT & PLAN NOTE
Blood pressure today is near optimally controlled  Diastolic is still slightly elevated  We will maintain current dosing and she will continue to check blood pressure readings at home and return to office for recheck again in 4 weeks for recheck  She can continue to do physical therapy as tolerated  Continue follow-up with neurology and cardiology

## 2023-02-27 NOTE — LETTER
February 27, 2023     Patient: Enedelia Shafer  YOB: 1960  Date of Visit: 2/27/2023      To Whom it May Concern:    Endeelia Shafer is under my professional care  Ines Ales was seen in my office on 2/27/2023  Ines Grace is cleared to return to physical therapy  We have modified her blood pressure medications and is now nearly normotensive  Please let me know if her blood pressures there run above 160/100  If you have any questions or concerns, please don't hesitate to call           Sincerely,          Danny Osler, DO        CC: No Recipients

## 2023-02-27 NOTE — PROGRESS NOTES
Assessment/Plan:     1  Essential hypertension  Assessment & Plan:  Blood pressure today is near optimally controlled  Diastolic is still slightly elevated  We will maintain current dosing and she will continue to check blood pressure readings at home and return to office for recheck again in 4 weeks for recheck  She can continue to do physical therapy as tolerated  Continue follow-up with neurology and cardiology  2  Screening mammogram for breast cancer    3  Screening for cervical cancer    4  Right-sided nontraumatic intracerebral hemorrhage of cerebellum ([address] )    5  Coronary artery dissection    6  Atrial fibrillation with rapid ventricular response (HCC)        Subjective:      Patient ID: Urban Dave is a 59 y o  female  Patient here for recheck on chronic conditions  She was recently started on amlodipine 5 mg daily and tolerates this medication well  The following portions of the patient's history were reviewed and updated as appropriate: allergies, current medications, past family history, past medical history, past social history, past surgical history, and problem list     Review of Systems   Constitutional: Negative  HENT: Negative  Eyes: Negative  Respiratory: Negative  Cardiovascular: Negative  Gastrointestinal: Negative  Endocrine: Negative  Genitourinary: Negative  Musculoskeletal: Negative  Skin: Negative  Allergic/Immunologic: Negative  Neurological: Negative  Hematological: Negative  Psychiatric/Behavioral: Negative  Objective:      /90 (BP Location: Left arm, Patient Position: Sitting, Cuff Size: Standard)   Pulse 60   Temp (!) 97 3 °F (36 3 °C) (Tympanic)   Ht 5' 7" (1 702 m)   Wt 76 9 kg (169 lb 9 6 oz)   SpO2 98%   BMI 26 56 kg/m²          Physical Exam  Vitals reviewed  Constitutional:       Appearance: She is well-developed  HENT:      Head: Normocephalic and atraumatic        Right Ear: External ear normal  Tympanic membrane is not erythematous or bulging  Left Ear: External ear normal  Tympanic membrane is not erythematous or bulging  Nose: Nose normal       Mouth/Throat:      Mouth: No oral lesions  Pharynx: No oropharyngeal exudate  Eyes:      General: No scleral icterus  Right eye: No discharge  Left eye: No discharge  Conjunctiva/sclera: Conjunctivae normal    Neck:      Thyroid: No thyromegaly  Cardiovascular:      Rate and Rhythm: Normal rate and regular rhythm  Heart sounds: Normal heart sounds  No murmur heard  No friction rub  No gallop  Pulmonary:      Effort: Pulmonary effort is normal  No respiratory distress  Breath sounds: No wheezing or rales  Chest:      Chest wall: No tenderness  Abdominal:      General: Bowel sounds are normal  There is no distension  Palpations: Abdomen is soft  There is no mass  Tenderness: There is no abdominal tenderness  There is no guarding or rebound  Musculoskeletal:         General: No tenderness or deformity  Normal range of motion  Cervical back: Normal range of motion and neck supple  Lymphadenopathy:      Cervical: No cervical adenopathy  Skin:     General: Skin is warm and dry  Coloration: Skin is not pale  Findings: No erythema or rash  Neurological:      Mental Status: She is alert and oriented to person, place, and time  Cranial Nerves: No cranial nerve deficit  Motor: No abnormal muscle tone  Coordination: Coordination normal       Deep Tendon Reflexes: Reflexes are normal and symmetric     Psychiatric:         Behavior: Behavior normal

## 2023-03-20 ENCOUNTER — TELEPHONE (OUTPATIENT)
Dept: NEUROLOGY | Facility: CLINIC | Age: 63
End: 2023-03-20

## 2023-03-20 NOTE — TELEPHONE ENCOUNTER
LMOM for the patient to call us back to reschedule her appt with Nereyda on 3/27 since she will not be in the office   Please offer first available

## 2023-03-21 NOTE — TELEPHONE ENCOUNTER
Second attempt to reach the patient to reschedule her appt with Marvin Bryan on Monday since she is not in the office  Will send a [employer] message to contact us

## 2023-03-22 NOTE — TELEPHONE ENCOUNTER
Third attempt to contact the patient to reschedule upcoming appt with Natalee Romero since she will not be in the office  Unable to reach patient  LMOM informing that we are canceling the appt and if she would like to reschedule to call us back and provided the number   MyChart message has also been sent to the patient

## 2023-03-28 ENCOUNTER — OFFICE VISIT (OUTPATIENT)
Dept: FAMILY MEDICINE CLINIC | Facility: CLINIC | Age: 63
End: 2023-03-28

## 2023-03-28 VITALS
WEIGHT: 168.2 LBS | HEART RATE: 61 BPM | BODY MASS INDEX: 26.4 KG/M2 | DIASTOLIC BLOOD PRESSURE: 80 MMHG | SYSTOLIC BLOOD PRESSURE: 134 MMHG | OXYGEN SATURATION: 99 % | TEMPERATURE: 97.3 F | HEIGHT: 67 IN

## 2023-03-28 DIAGNOSIS — Z86.73 HISTORY OF STROKE: ICD-10-CM

## 2023-03-28 DIAGNOSIS — I10 ESSENTIAL HYPERTENSION: Primary | ICD-10-CM

## 2023-03-28 NOTE — PROGRESS NOTES
"Assessment/Plan:     1  Essential hypertension  Assessment & Plan:  Patient's blood pressure is now under good control on amlodipine 5 mg daily as well as losartan 50 mg daily and metoprolol 50 mg twice daily  Recommend recheck again in 6 months  She continues follow-up with neurology regarding history of stroke  Therapy has been very helpful  Orders:  -     Ambulatory Referral to OT  Adaptability Evaluation; Future    2  History of stroke  -     Ambulatory Referral to OT  Adaptability Evaluation; Future          Subjective:      Patient ID: Jose Powell is a 59 y o  female  Patient is doing well status post stroke  Blood pressure is now under good control  Tolerating medication  The following portions of the patient's history were reviewed and updated as appropriate: allergies, current medications, past family history, past medical history, past social history, past surgical history, and problem list     Review of Systems   Constitutional: Negative  HENT: Negative  Eyes: Negative  Respiratory: Negative  Cardiovascular: Negative  Gastrointestinal: Negative  Endocrine: Negative  Genitourinary: Negative  Musculoskeletal: Negative  Skin: Negative  Allergic/Immunologic: Negative  Neurological: Negative  Hematological: Negative  Psychiatric/Behavioral: Negative  Objective:      /80 (BP Location: Left arm, Patient Position: Sitting, Cuff Size: Standard)   Pulse 61   Temp (!) 97 3 °F (36 3 °C) (Tympanic)   Ht 5' 7\" (1 702 m)   Wt 76 3 kg (168 lb 3 2 oz)   SpO2 99%   BMI 26 34 kg/m²          Physical Exam  Vitals reviewed  Constitutional:       Appearance: She is well-developed  HENT:      Head: Normocephalic and atraumatic  Right Ear: External ear normal  Tympanic membrane is not erythematous or bulging  Left Ear: External ear normal  Tympanic membrane is not erythematous or bulging        Nose: Nose normal       " Mouth/Throat:      Mouth: No oral lesions  Pharynx: No oropharyngeal exudate  Eyes:      General: No scleral icterus  Right eye: No discharge  Left eye: No discharge  Conjunctiva/sclera: Conjunctivae normal    Neck:      Thyroid: No thyromegaly  Cardiovascular:      Rate and Rhythm: Normal rate and regular rhythm  Heart sounds: Normal heart sounds  No murmur heard  No friction rub  No gallop  Pulmonary:      Effort: Pulmonary effort is normal  No respiratory distress  Breath sounds: No wheezing or rales  Chest:      Chest wall: No tenderness  Abdominal:      General: Bowel sounds are normal  There is no distension  Palpations: Abdomen is soft  There is no mass  Tenderness: There is no abdominal tenderness  There is no guarding or rebound  Musculoskeletal:         General: No tenderness or deformity  Normal range of motion  Cervical back: Normal range of motion and neck supple  Lymphadenopathy:      Cervical: No cervical adenopathy  Skin:     General: Skin is warm and dry  Coloration: Skin is not pale  Findings: No erythema or rash  Neurological:      Mental Status: She is alert and oriented to person, place, and time  Cranial Nerves: No cranial nerve deficit  Motor: No abnormal muscle tone  Coordination: Coordination normal       Deep Tendon Reflexes: Reflexes are normal and symmetric     Psychiatric:         Behavior: Behavior normal

## 2023-03-28 NOTE — ASSESSMENT & PLAN NOTE
Patient's blood pressure is now under good control on amlodipine 5 mg daily as well as losartan 50 mg daily and metoprolol 50 mg twice daily  Recommend recheck again in 6 months  She continues follow-up with neurology regarding history of stroke  Therapy has been very helpful

## 2023-05-25 ENCOUNTER — TELEPHONE (OUTPATIENT)
Dept: NEUROLOGY | Facility: CLINIC | Age: 63
End: 2023-05-25

## 2023-05-25 NOTE — TELEPHONE ENCOUNTER
Patient is on the wait list  There is an opening tomorrow on 5/26/23 on Celso's schedule  Called patient to offer an appointment  No answer  Left a voice message requesting for a return call  Provided office's phone number

## 2023-08-02 ENCOUNTER — VBI (OUTPATIENT)
Dept: ADMINISTRATIVE | Facility: OTHER | Age: 63
End: 2023-08-02

## 2023-08-02 NOTE — TELEPHONE ENCOUNTER
08/02/23 8:40 AM     [employer] SmartForm used to document caregap status.     [facility]

## 2023-08-08 DIAGNOSIS — I10 ESSENTIAL HYPERTENSION: ICD-10-CM

## 2023-08-08 RX ORDER — AMLODIPINE BESYLATE 5 MG/1
5 TABLET ORAL DAILY
Qty: 90 TABLET | Refills: 0 | Status: SHIPPED | OUTPATIENT
Start: 2023-08-08

## 2023-09-01 ENCOUNTER — VBI (OUTPATIENT)
Dept: ADMINISTRATIVE | Facility: OTHER | Age: 63
End: 2023-09-01

## 2023-09-08 ENCOUNTER — VBI (OUTPATIENT)
Dept: ADMINISTRATIVE | Facility: OTHER | Age: 63
End: 2023-09-08

## 2023-10-02 ENCOUNTER — OFFICE VISIT (OUTPATIENT)
Dept: FAMILY MEDICINE CLINIC | Facility: CLINIC | Age: 63
End: 2023-10-02
Payer: COMMERCIAL

## 2023-10-02 VITALS
HEIGHT: 67 IN | OXYGEN SATURATION: 100 % | HEART RATE: 60 BPM | SYSTOLIC BLOOD PRESSURE: 124 MMHG | WEIGHT: 167 LBS | BODY MASS INDEX: 26.21 KG/M2 | TEMPERATURE: 98 F | DIASTOLIC BLOOD PRESSURE: 80 MMHG

## 2023-10-02 DIAGNOSIS — I25.42 CORONARY ARTERY DISSECTION: ICD-10-CM

## 2023-10-02 DIAGNOSIS — I10 ESSENTIAL HYPERTENSION: ICD-10-CM

## 2023-10-02 DIAGNOSIS — Z12.11 SCREENING FOR COLORECTAL CANCER: ICD-10-CM

## 2023-10-02 DIAGNOSIS — Z12.12 SCREENING FOR COLORECTAL CANCER: ICD-10-CM

## 2023-10-02 DIAGNOSIS — F33.0 MILD EPISODE OF RECURRENT MAJOR DEPRESSIVE DISORDER (HCC): ICD-10-CM

## 2023-10-02 DIAGNOSIS — Z12.4 SCREENING FOR CERVICAL CANCER: ICD-10-CM

## 2023-10-02 DIAGNOSIS — E87.6 HYPOKALEMIA: ICD-10-CM

## 2023-10-02 DIAGNOSIS — I61.4 RIGHT-SIDED NONTRAUMATIC INTRACEREBRAL HEMORRHAGE OF CEREBELLUM (HCC): Primary | ICD-10-CM

## 2023-10-02 DIAGNOSIS — I48.91 ATRIAL FIBRILLATION WITH RAPID VENTRICULAR RESPONSE (HCC): ICD-10-CM

## 2023-10-02 PROCEDURE — 99214 OFFICE O/P EST MOD 30 MIN: CPT | Performed by: FAMILY MEDICINE

## 2023-10-02 NOTE — PROGRESS NOTES
Assessment/Plan: Patient seems to be doing well. Medication follow-up. .     1. Right-sided nontraumatic intracerebral hemorrhage of cerebellum ([address])    2. Coronary artery dissection    3. Screening for cervical cancer  -     Ambulatory Referral to Gynecology; Future    4. Screening for colorectal cancer  -     Ambulatory Referral to Gastroenterology; Future    5. Hypokalemia    6. Atrial fibrillation with rapid ventricular response (HCC)    7. Essential hypertension    8. Mild episode of recurrent major depressive disorder (HCC)          Subjective:      Patient ID: Bashir Andrews is a 59 y.o. female. Patient with history of stroke and atrial fibrillation is seen today for recheck on chronic conditions. She is generally feeling well. She continues to gradually recover from her stroke and still has some fine motor issues with her right hand but otherwise has been doing well. She is continuing to try to do typing and computer work. Denies any speech or appetite changes. No gait abnormalities. The following portions of the patient's history were reviewed and updated as appropriate: allergies, current medications, past family history, past medical history, past social history, past surgical history, and problem list.    Review of Systems   Constitutional: Negative. HENT: Negative. Eyes: Negative. Respiratory: Negative. Cardiovascular: Negative. Gastrointestinal: Negative. Endocrine: Negative. Genitourinary: Negative. Musculoskeletal: Negative. Skin: Negative. Allergic/Immunologic: Negative. Neurological: Negative. Hematological: Negative. Psychiatric/Behavioral: Negative. Objective:      /80 (BP Location: Left arm, Patient Position: Sitting, Cuff Size: Standard)   Pulse 60   Temp 98 °F (36.7 °C) (Tympanic)   Ht 5' 7" (1.702 m)   Wt 75.8 kg (167 lb)   SpO2 100%   BMI 26.16 kg/m²          Physical Exam  Vitals reviewed.    Constitutional: Appearance: She is well-developed. HENT:      Head: Normocephalic and atraumatic. Right Ear: External ear normal. Tympanic membrane is not erythematous or bulging. Left Ear: External ear normal. Tympanic membrane is not erythematous or bulging. Nose: Nose normal.      Mouth/Throat:      Mouth: No oral lesions. Pharynx: No oropharyngeal exudate. Eyes:      General: No scleral icterus. Right eye: No discharge. Left eye: No discharge. Conjunctiva/sclera: Conjunctivae normal.   Neck:      Thyroid: No thyromegaly. Cardiovascular:      Rate and Rhythm: Normal rate and regular rhythm. Heart sounds: Normal heart sounds. No murmur heard. No friction rub. No gallop. Pulmonary:      Effort: Pulmonary effort is normal. No respiratory distress. Breath sounds: No wheezing or rales. Chest:      Chest wall: No tenderness. Abdominal:      General: Bowel sounds are normal. There is no distension. Palpations: Abdomen is soft. There is no mass. Tenderness: There is no abdominal tenderness. There is no guarding or rebound. Musculoskeletal:         General: No tenderness or deformity. Normal range of motion. Cervical back: Normal range of motion and neck supple. Lymphadenopathy:      Cervical: No cervical adenopathy. Skin:     General: Skin is warm and dry. Coloration: Skin is not pale. Findings: No erythema or rash. Neurological:      Mental Status: She is alert and oriented to person, place, and time. Cranial Nerves: No cranial nerve deficit. Motor: No abnormal muscle tone. Coordination: Coordination normal.      Deep Tendon Reflexes: Reflexes are normal and symmetric.    Psychiatric:         Behavior: Behavior normal.

## 2023-12-05 DIAGNOSIS — Z00.00 HEALTH CARE MAINTENANCE: ICD-10-CM

## 2023-12-05 RX ORDER — VENLAFAXINE 75 MG/1
75 TABLET ORAL DAILY
Qty: 90 TABLET | Refills: 0 | Status: SHIPPED | OUTPATIENT
Start: 2023-12-05

## 2024-03-01 DIAGNOSIS — I10 ESSENTIAL HYPERTENSION: ICD-10-CM

## 2024-03-01 DIAGNOSIS — Z00.00 HEALTH CARE MAINTENANCE: ICD-10-CM

## 2024-03-01 RX ORDER — VENLAFAXINE 75 MG/1
75 TABLET ORAL DAILY
Qty: 90 TABLET | Refills: 1 | Status: SHIPPED | OUTPATIENT
Start: 2024-03-01

## 2024-03-01 RX ORDER — LOSARTAN POTASSIUM 50 MG/1
50 TABLET ORAL DAILY
Qty: 30 TABLET | Refills: 0 | Status: SHIPPED | OUTPATIENT
Start: 2024-03-01

## 2024-03-28 DIAGNOSIS — I10 ESSENTIAL HYPERTENSION: ICD-10-CM

## 2024-03-28 RX ORDER — LOSARTAN POTASSIUM 50 MG/1
50 TABLET ORAL DAILY
Qty: 30 TABLET | Refills: 0 | Status: SHIPPED | OUTPATIENT
Start: 2024-03-28

## 2024-04-02 ENCOUNTER — OFFICE VISIT (OUTPATIENT)
Dept: FAMILY MEDICINE CLINIC | Facility: CLINIC | Age: 64
End: 2024-04-02
Payer: COMMERCIAL

## 2024-04-02 VITALS
SYSTOLIC BLOOD PRESSURE: 134 MMHG | WEIGHT: 175 LBS | TEMPERATURE: 97 F | HEART RATE: 62 BPM | DIASTOLIC BLOOD PRESSURE: 80 MMHG | BODY MASS INDEX: 27.47 KG/M2 | OXYGEN SATURATION: 99 % | HEIGHT: 67 IN

## 2024-04-02 DIAGNOSIS — Z11.4 SCREENING FOR HIV (HUMAN IMMUNODEFICIENCY VIRUS): ICD-10-CM

## 2024-04-02 DIAGNOSIS — Z12.4 SCREENING FOR CERVICAL CANCER: ICD-10-CM

## 2024-04-02 DIAGNOSIS — Z12.11 SCREENING FOR COLORECTAL CANCER: ICD-10-CM

## 2024-04-02 DIAGNOSIS — Z00.00 WELL ADULT EXAM: Primary | ICD-10-CM

## 2024-04-02 DIAGNOSIS — Z12.12 SCREENING FOR COLORECTAL CANCER: ICD-10-CM

## 2024-04-02 DIAGNOSIS — I10 ESSENTIAL HYPERTENSION: ICD-10-CM

## 2024-04-02 DIAGNOSIS — Z12.31 ENCOUNTER FOR SCREENING MAMMOGRAM FOR BREAST CANCER: ICD-10-CM

## 2024-04-02 DIAGNOSIS — Z11.59 NEED FOR HEPATITIS C SCREENING TEST: ICD-10-CM

## 2024-04-02 DIAGNOSIS — R30.0 DYSURIA: ICD-10-CM

## 2024-04-02 PROCEDURE — 99396 PREV VISIT EST AGE 40-64: CPT | Performed by: FAMILY MEDICINE

## 2024-04-02 NOTE — PROGRESS NOTES
Assessment/Plan: Anticipatory guidance provided.  Recommend urine culture and UA for history of recent dysuria.  Consider restarting physical therapy if any subsequent leg weakness.  Continue follow-up with cardiology every 6 to 12 months.     1. Well adult exam    2. Need for hepatitis C screening test  -     Hepatitis C Antibody; Future    3. Screening for HIV (human immunodeficiency virus)  -     HIV 1/2 AG/AB w Reflex [facility] for 2 yr old and above; Future    4. Screening for cervical cancer  -     Ambulatory referral to Obstetrics / Gynecology; Future    5. Encounter for screening mammogram for breast cancer  -     Mammo screening bilateral w 3d & cad; Future; Expected date: 04/02/2024    6. Screening for colorectal cancer  -     Cologuard    7. Essential hypertension  -     CBC and differential  -     Comprehensive metabolic panel  -     Lipid Panel with Direct LDL reflex  -     UA (URINE) with reflex to Scope; Future  -     UA (URINE) with reflex to Scope    8. Dysuria  -     Urine culture; Future  -     Urine culture          Subjective:      Patient ID: Radha Garcia is a 64 y.o. female.    Patient is here for well check.  Generally feeling well.  She does note some mild dysuria.  She is gradually recovered from history of intracerebellar hemorrhage in November 2022.  She notes occasional leg weakness when she is tired on the right leg but no gait abnormalities otherwise.  She has completed physical therapy.  She continues to follow with cardiology and remains off of any blood thinners.  A-fib is controlled.             The following portions of the patient's history were reviewed and updated as appropriate: allergies, current medications, past family history, past medical history, past social history, past surgical history, and problem list.    Review of Systems   Constitutional: Negative.    HENT: Negative.     Eyes: Negative.    Respiratory: Negative.     Cardiovascular: Negative.    Gastrointestinal:  "Negative.    Endocrine: Negative.    Genitourinary: Negative.    Musculoskeletal: Negative.    Skin: Negative.    Allergic/Immunologic: Negative.    Neurological: Negative.    Hematological: Negative.    Psychiatric/Behavioral: Negative.           Objective:      /80 (BP Location: Left arm, Patient Position: Sitting, Cuff Size: Standard)   Pulse 62   Temp (!) 97 °F (36.1 °C) (Tympanic)   Ht 5' 7\" (1.702 m)   Wt 79.4 kg (175 lb)   SpO2 99%   BMI 27.41 kg/m²          Physical Exam  Vitals reviewed.   Constitutional:       Appearance: She is well-developed.   HENT:      Head: Normocephalic and atraumatic.      Right Ear: External ear normal. Tympanic membrane is not erythematous or bulging.      Left Ear: External ear normal. Tympanic membrane is not erythematous or bulging.      Nose: Nose normal.      Mouth/Throat:      Mouth: No oral lesions.      Pharynx: No oropharyngeal exudate.   Eyes:      General: No scleral icterus.        Right eye: No discharge.         Left eye: No discharge.      Conjunctiva/sclera: Conjunctivae normal.   Neck:      Thyroid: No thyromegaly.   Cardiovascular:      Rate and Rhythm: Normal rate. Rhythm irregular.      Heart sounds: Normal heart sounds. No murmur heard.     No friction rub. No gallop.   Pulmonary:      Effort: Pulmonary effort is normal. No respiratory distress.      Breath sounds: No wheezing or rales.   Chest:      Chest wall: No tenderness.   Abdominal:      General: Bowel sounds are normal. There is no distension.      Palpations: Abdomen is soft. There is no mass.      Tenderness: There is no abdominal tenderness. There is no guarding or rebound.   Musculoskeletal:         General: No tenderness or deformity. Normal range of motion.      Cervical back: Normal range of motion and neck supple.   Lymphadenopathy:      Cervical: No cervical adenopathy.   Skin:     General: Skin is warm and dry.      Coloration: Skin is not pale.      Findings: No erythema or " rash.   Neurological:      Mental Status: She is alert and oriented to person, place, and time.      Cranial Nerves: No cranial nerve deficit.      Motor: No abnormal muscle tone.      Coordination: Coordination normal.      Deep Tendon Reflexes: Reflexes are normal and symmetric.   Psychiatric:         Behavior: Behavior normal.

## 2024-05-02 DIAGNOSIS — I10 ESSENTIAL HYPERTENSION: ICD-10-CM

## 2024-05-03 RX ORDER — LOSARTAN POTASSIUM 50 MG/1
50 TABLET ORAL DAILY
Qty: 30 TABLET | Refills: 5 | Status: SHIPPED | OUTPATIENT
Start: 2024-05-03

## 2024-05-13 ENCOUNTER — APPOINTMENT (OUTPATIENT)
Dept: LAB | Facility: CLINIC | Age: 64
End: 2024-05-13
Payer: COMMERCIAL

## 2024-05-13 DIAGNOSIS — Z11.4 SCREENING FOR HIV (HUMAN IMMUNODEFICIENCY VIRUS): ICD-10-CM

## 2024-05-13 DIAGNOSIS — Z11.59 NEED FOR HEPATITIS C SCREENING TEST: ICD-10-CM

## 2024-05-13 LAB
ALBUMIN SERPL BCP-MCNC: 4.2 G/DL (ref 3.5–5)
ALP SERPL-CCNC: 81 U/L (ref 34–104)
ALT SERPL W P-5'-P-CCNC: 26 U/L (ref 7–52)
ANION GAP SERPL CALCULATED.3IONS-SCNC: 8 MMOL/L (ref 4–13)
AST SERPL W P-5'-P-CCNC: 24 U/L (ref 13–39)
BACTERIA UR QL AUTO: ABNORMAL /HPF
BASOPHILS # BLD AUTO: 0.04 THOUSANDS/ÂΜL (ref 0–0.1)
BASOPHILS NFR BLD AUTO: 1 % (ref 0–1)
BILIRUB SERPL-MCNC: 0.78 MG/DL (ref 0.2–1)
BILIRUB UR QL STRIP: NEGATIVE
BUN SERPL-MCNC: 15 MG/DL (ref 5–25)
CALCIUM SERPL-MCNC: 9.5 MG/DL (ref 8.4–10.2)
CHLORIDE SERPL-SCNC: 102 MMOL/L (ref 96–108)
CHOLEST SERPL-MCNC: 241 MG/DL
CLARITY UR: ABNORMAL
CO2 SERPL-SCNC: 30 MMOL/L (ref 21–32)
COLOR UR: ABNORMAL
CREAT SERPL-MCNC: 0.73 MG/DL (ref 0.6–1.3)
EOSINOPHIL # BLD AUTO: 0.07 THOUSAND/ÂΜL (ref 0–0.61)
EOSINOPHIL NFR BLD AUTO: 1 % (ref 0–6)
ERYTHROCYTE [DISTWIDTH] IN BLOOD BY AUTOMATED COUNT: 13.1 % (ref 11.6–15.1)
GFR SERPL CREATININE-BSD FRML MDRD: 87 ML/MIN/1.73SQ M
GLUCOSE P FAST SERPL-MCNC: 93 MG/DL (ref 65–99)
GLUCOSE UR STRIP-MCNC: NEGATIVE MG/DL
HCT VFR BLD AUTO: 47.6 % (ref 34.8–46.1)
HCV AB SER QL: NORMAL
HDLC SERPL-MCNC: 63 MG/DL
HGB BLD-MCNC: 15.5 G/DL (ref 11.5–15.4)
HGB UR QL STRIP.AUTO: ABNORMAL
HIV 1+2 AB+HIV1 P24 AG SERPL QL IA: NORMAL
HIV 2 AB SERPL QL IA: NORMAL
HIV1 AB SERPL QL IA: NORMAL
HIV1 P24 AG SERPL QL IA: NORMAL
IMM GRANULOCYTES # BLD AUTO: 0.01 THOUSAND/UL (ref 0–0.2)
IMM GRANULOCYTES NFR BLD AUTO: 0 % (ref 0–2)
KETONES UR STRIP-MCNC: NEGATIVE MG/DL
LDLC SERPL CALC-MCNC: 158 MG/DL (ref 0–100)
LEUKOCYTE ESTERASE UR QL STRIP: ABNORMAL
LYMPHOCYTES # BLD AUTO: 2.46 THOUSANDS/ÂΜL (ref 0.6–4.47)
LYMPHOCYTES NFR BLD AUTO: 42 % (ref 14–44)
MCH RBC QN AUTO: 29.2 PG (ref 26.8–34.3)
MCHC RBC AUTO-ENTMCNC: 32.6 G/DL (ref 31.4–37.4)
MCV RBC AUTO: 90 FL (ref 82–98)
MONOCYTES # BLD AUTO: 0.49 THOUSAND/ÂΜL (ref 0.17–1.22)
MONOCYTES NFR BLD AUTO: 8 % (ref 4–12)
NEUTROPHILS # BLD AUTO: 2.73 THOUSANDS/ÂΜL (ref 1.85–7.62)
NEUTS SEG NFR BLD AUTO: 48 % (ref 43–75)
NITRITE UR QL STRIP: POSITIVE
NON-SQ EPI CELLS URNS QL MICRO: ABNORMAL /HPF
NRBC BLD AUTO-RTO: 0 /100 WBCS
PH UR STRIP.AUTO: 7.5 [PH]
PLATELET # BLD AUTO: 224 THOUSANDS/UL (ref 149–390)
PMV BLD AUTO: 10.9 FL (ref 8.9–12.7)
POTASSIUM SERPL-SCNC: 4.9 MMOL/L (ref 3.5–5.3)
PROT SERPL-MCNC: 7.2 G/DL (ref 6.4–8.4)
PROT UR STRIP-MCNC: ABNORMAL MG/DL
RBC # BLD AUTO: 5.31 MILLION/UL (ref 3.81–5.12)
RBC #/AREA URNS AUTO: ABNORMAL /HPF
RENAL EPI CELLS #/AREA URNS HPF: PRESENT /[HPF]
SODIUM SERPL-SCNC: 140 MMOL/L (ref 135–147)
SP GR UR STRIP.AUTO: 1.01 (ref 1–1.03)
TRANS CELLS #/AREA URNS HPF: PRESENT /[HPF]
TRIGL SERPL-MCNC: 99 MG/DL
UROBILINOGEN UR STRIP-ACNC: <2 MG/DL
WBC # BLD AUTO: 5.8 THOUSAND/UL (ref 4.31–10.16)
WBC #/AREA URNS AUTO: ABNORMAL /HPF

## 2024-05-13 PROCEDURE — 36415 COLL VENOUS BLD VENIPUNCTURE: CPT

## 2024-05-13 PROCEDURE — 86803 HEPATITIS C AB TEST: CPT

## 2024-05-13 PROCEDURE — 87389 HIV-1 AG W/HIV-1&-2 AB AG IA: CPT

## 2024-05-14 ENCOUNTER — TELEPHONE (OUTPATIENT)
Age: 64
End: 2024-05-14

## 2024-05-14 DIAGNOSIS — N39.0 URINARY TRACT INFECTION WITHOUT HEMATURIA, SITE UNSPECIFIED: Primary | ICD-10-CM

## 2024-05-14 RX ORDER — CIPROFLOXACIN 500 MG/1
500 TABLET, FILM COATED ORAL EVERY 12 HOURS SCHEDULED
Qty: 10 TABLET | Refills: 0 | Status: SHIPPED | OUTPATIENT
Start: 2024-05-14 | End: 2024-05-19

## 2024-05-14 NOTE — TELEPHONE ENCOUNTER
Patient called to discuss lab results. Please have pcp review results and return patient call to discuss.

## 2024-05-15 ENCOUNTER — TELEPHONE (OUTPATIENT)
Dept: FAMILY MEDICINE CLINIC | Facility: CLINIC | Age: 64
End: 2024-05-15

## 2024-05-15 NOTE — TELEPHONE ENCOUNTER
Lmom to call office back   Ok to tell pt  note below     ----- Message from Lenin Elder DO sent at 5/14/2024  5:59 PM EDT -----  Patient with UTI shown on urine culture.  Recommend starting antibiotics.  Prescription sent

## 2024-05-15 NOTE — TELEPHONE ENCOUNTER
Patient returned call regarding test results.  Warm transfer to [facility] successful.

## 2024-05-15 NOTE — TELEPHONE ENCOUNTER
UA results reviewed with patient. She is aware of ABT order. Patient would like her PCP to review her labs and a call back if he has any recommendations. Otherwise she will follow up as needed.

## 2024-05-16 LAB
BACTERIA UR CULT: ABNORMAL
BACTERIA UR CULT: ABNORMAL

## 2024-07-15 ENCOUNTER — VBI (OUTPATIENT)
Dept: ADMINISTRATIVE | Facility: OTHER | Age: 64
End: 2024-07-15

## 2024-07-15 NOTE — TELEPHONE ENCOUNTER
07/15/24 9:12 AM     Chart reviewed for Pap Smear (HPV) aka Cervical Cancer Screening ; nothing is submitted to the patient's insurance at this time.     Jason Harvey MA   [facility]

## 2024-08-08 DIAGNOSIS — Z00.00 HEALTH CARE MAINTENANCE: ICD-10-CM

## 2024-08-09 RX ORDER — VENLAFAXINE 75 MG/1
75 TABLET ORAL DAILY
Qty: 90 TABLET | Refills: 0 | Status: SHIPPED | OUTPATIENT
Start: 2024-08-09

## 2024-09-03 ENCOUNTER — TELEPHONE (OUTPATIENT)
Dept: OTHER | Facility: HOSPITAL | Age: 64
End: 2024-09-03

## 2024-09-03 NOTE — TELEPHONE ENCOUNTER
Patient called for refill on effexor 75mg   90 tabs were sent to [facility] on 8/9/24  Patient to call pharmacy     Patient also stated for cozaar next month she prefers for 90 day supply-patient will callback when it gets closer to refill to request that for 90 day supply

## 2024-09-07 DIAGNOSIS — Z00.00 HEALTH CARE MAINTENANCE: ICD-10-CM

## 2024-09-08 RX ORDER — VENLAFAXINE 75 MG/1
75 TABLET ORAL DAILY
Qty: 90 TABLET | Refills: 1 | Status: SHIPPED | OUTPATIENT
Start: 2024-09-08

## 2024-09-13 ENCOUNTER — VBI (OUTPATIENT)
Dept: ADMINISTRATIVE | Facility: OTHER | Age: 64
End: 2024-09-13

## 2024-09-13 NOTE — TELEPHONE ENCOUNTER
09/13/24 10:05 AM     Chart reviewed for CRC: Colonoscopy ; nothing is submitted to the patient's insurance at this time.     Kathy Rodrigez   [facility]

## 2024-09-26 DIAGNOSIS — I10 ESSENTIAL HYPERTENSION: ICD-10-CM

## 2024-09-26 RX ORDER — LOSARTAN POTASSIUM 50 MG/1
50 TABLET ORAL DAILY
Qty: 30 TABLET | Refills: 5 | Status: SHIPPED | OUTPATIENT
Start: 2024-09-26

## 2024-09-26 NOTE — TELEPHONE ENCOUNTER
losartan (COZAAR) 50 mg tablet TAKE ONE TABLET BY MOUTH DAILY     Pls send in a 90 supply instead of a 30 day supply.

## 2024-11-05 ENCOUNTER — VBI (OUTPATIENT)
Dept: ADMINISTRATIVE | Facility: OTHER | Age: 64
End: 2024-11-05

## 2024-11-05 NOTE — TELEPHONE ENCOUNTER
11/05/24 9:30 AM     Chart reviewed for BP was/were submitted to the patient's insurance.     Jason Harvey MA   [facility]

## 2025-03-24 ENCOUNTER — OFFICE VISIT (OUTPATIENT)
Dept: FAMILY MEDICINE CLINIC | Facility: CLINIC | Age: 65
End: 2025-03-24
Payer: COMMERCIAL

## 2025-03-24 ENCOUNTER — APPOINTMENT (OUTPATIENT)
Dept: RADIOLOGY | Age: 65
End: 2025-03-24
Payer: COMMERCIAL

## 2025-03-24 VITALS
HEIGHT: 67 IN | TEMPERATURE: 98.9 F | OXYGEN SATURATION: 99 % | WEIGHT: 178.8 LBS | HEART RATE: 78 BPM | SYSTOLIC BLOOD PRESSURE: 138 MMHG | BODY MASS INDEX: 28.06 KG/M2 | DIASTOLIC BLOOD PRESSURE: 80 MMHG

## 2025-03-24 DIAGNOSIS — Z12.31 ENCOUNTER FOR SCREENING MAMMOGRAM FOR MALIGNANT NEOPLASM OF BREAST: ICD-10-CM

## 2025-03-24 DIAGNOSIS — M53.3 SACRAL PAIN: ICD-10-CM

## 2025-03-24 DIAGNOSIS — F33.42 RECURRENT MAJOR DEPRESSIVE DISORDER, IN FULL REMISSION (HCC): ICD-10-CM

## 2025-03-24 DIAGNOSIS — Z86.73 HISTORY OF STROKE: ICD-10-CM

## 2025-03-24 DIAGNOSIS — M53.3 SACRAL PAIN: Primary | ICD-10-CM

## 2025-03-24 DIAGNOSIS — Z12.11 COLON CANCER SCREENING: ICD-10-CM

## 2025-03-24 PROBLEM — R03.0 ELEVATED BLOOD PRESSURE READING: Status: RESOLVED | Noted: 2023-02-14 | Resolved: 2025-03-24

## 2025-03-24 PROCEDURE — 99214 OFFICE O/P EST MOD 30 MIN: CPT | Performed by: FAMILY MEDICINE

## 2025-03-24 PROCEDURE — 72220 X-RAY EXAM SACRUM TAILBONE: CPT

## 2025-03-24 RX ORDER — VENLAFAXINE 37.5 MG/1
37.5 TABLET ORAL DAILY
Qty: 90 TABLET | Refills: 3 | Status: SHIPPED | OUTPATIENT
Start: 2025-03-24

## 2025-03-24 NOTE — PROGRESS NOTES
"Name: Radha Garcia      : 1960      MRN: [id number]  Encounter Provider: Lenin Elder DO  Encounter Date: 3/24/2025   Encounter department: [facility]  :  Assessment & Plan  Sacral pain  Patient with sacral pain status post fall.  Recommend follow-up with us if no improvement or worsening symptoms.  Consider physical therapy.  Await imaging test.  Orders:    XR sacrum and coccyx; Future    Ambulatory Referral to Physical Therapy; Future    History of stroke         Recurrent major depressive disorder, in full remission (HCC)  Patient is weaning down off of the Effexor.  She does need lower dose of the medication.  37 mg prescription sent.  Orders:    venlafaxine (EFFEXOR) 37.5 mg tablet; Take 1 tablet (37.5 mg total) by mouth daily    Colon cancer screening    Orders:    Cologuard    Encounter for screening mammogram for malignant neoplasm of breast    Orders:    Mammo screening bilateral w 3d and cad; Future           History of Present Illness   Patient is seen today after a fall a week ago at home.  She fell backward and landed on her sacral area on a hardwood floor.  She has had pain to the \"tailbone area\" for the last 1 week.  No radiculopathy.  No bowel or bladder incontinence.  She does have prior history of stroke in the past but no recurrent symptoms.  Denies any weakness to the arms or legs.      Review of Systems   Constitutional: Negative.    HENT: Negative.     Eyes: Negative.    Respiratory: Negative.     Cardiovascular: Negative.    Gastrointestinal: Negative.    Endocrine: Negative.    Genitourinary: Negative.    Musculoskeletal:  Positive for back pain.   Skin: Negative.    Allergic/Immunologic: Negative.    Neurological: Negative.    Hematological: Negative.    Psychiatric/Behavioral: Negative.         Objective   /84   Pulse 78   Temp 98.9 °F (37.2 °C) (Tympanic)   Ht 5' 7\" (1.702 m)   Wt 81.1 kg (178 lb 12.8 oz)   SpO2 99%   BMI 28.00 kg/m²    "   Physical Exam  Constitutional:       General: She is not in acute distress.     Appearance: She is well-developed. She is not diaphoretic.   HENT:      Head: Normocephalic and atraumatic.      Right Ear: External ear normal.      Left Ear: External ear normal.      Nose: Nose normal.      Mouth/Throat:      Pharynx: Oropharynx is clear.   Eyes:      General: No scleral icterus.        Right eye: No discharge.         Left eye: No discharge.      Conjunctiva/sclera: Conjunctivae normal.      Pupils: Pupils are equal, round, and reactive to light.   Neck:      Thyroid: No thyromegaly.      Vascular: No JVD.      Trachea: No tracheal deviation.   Cardiovascular:      Rate and Rhythm: Normal rate and regular rhythm.      Heart sounds: Normal heart sounds. No murmur heard.     No friction rub. No gallop.   Pulmonary:      Effort: Pulmonary effort is normal. No respiratory distress.      Breath sounds: Normal breath sounds. No wheezing or rales.   Chest:      Chest wall: No tenderness.   Abdominal:      General: Bowel sounds are normal. There is no distension.      Palpations: Abdomen is soft. There is no mass.      Tenderness: There is no abdominal tenderness. There is no guarding or rebound.      Hernia: No hernia is present.   Musculoskeletal:         General: No tenderness or deformity. Normal range of motion.      Cervical back: Normal range of motion and neck supple.   Lymphadenopathy:      Cervical: No cervical adenopathy.   Skin:     General: Skin is warm and dry.      Capillary Refill: Capillary refill takes less than 2 seconds.      Coloration: Skin is not pale.      Findings: No erythema or rash.   Neurological:      Mental Status: She is alert and oriented to person, place, and time.      Cranial Nerves: No cranial nerve deficit.      Sensory: No sensory deficit.      Motor: No abnormal muscle tone.      Coordination: Coordination normal.      Deep Tendon Reflexes: Reflexes normal.   Psychiatric:          Mood and Affect: Mood normal.         Behavior: Behavior normal.

## 2025-03-25 ENCOUNTER — TELEPHONE (OUTPATIENT)
Age: 65
End: 2025-03-25

## 2025-03-25 NOTE — TELEPHONE ENCOUNTER
Patient called in regards to her x- ray that she got done yesterday. Patient wanted to know if her results have came in. Patient could not verify phone number nor was the address correct in her chart. Patient was seen in the office yesterday.

## 2025-03-26 ENCOUNTER — RESULTS FOLLOW-UP (OUTPATIENT)
Dept: FAMILY MEDICINE CLINIC | Facility: CLINIC | Age: 65
End: 2025-03-26

## 2025-04-04 DIAGNOSIS — I10 ESSENTIAL HYPERTENSION: ICD-10-CM

## 2025-04-04 RX ORDER — LOSARTAN POTASSIUM 50 MG/1
50 TABLET ORAL DAILY
Qty: 30 TABLET | Refills: 5 | Status: SHIPPED | OUTPATIENT
Start: 2025-04-04

## 2025-05-13 ENCOUNTER — VBI (OUTPATIENT)
Dept: ADMINISTRATIVE | Facility: OTHER | Age: 65
End: 2025-05-13

## 2025-05-13 NOTE — TELEPHONE ENCOUNTER
05/13/25 6:40 AM     Chart reviewed for Pap Smear (HPV) aka Cervical Cancer Screening ; nothing is submitted to the patient's insurance at this time.     Kathy Rodrigez   [facility]

## 2025-05-16 ENCOUNTER — TELEPHONE (OUTPATIENT)
Age: 65
End: 2025-05-16

## 2025-05-16 NOTE — TELEPHONE ENCOUNTER
Patient called and stated that she dropped a form off 3 weeks ago to get money back for a cruise.  She fell and hurt her tail bone and can not longer go.  They need the form filled out to get a refund.  Spoke to Gisele and she will see if the form was completed and call the patient back.      Thank you  [phone]

## 2025-05-21 ENCOUNTER — OFFICE VISIT (OUTPATIENT)
Dept: FAMILY MEDICINE CLINIC | Facility: CLINIC | Age: 65
End: 2025-05-21
Payer: COMMERCIAL

## 2025-05-21 VITALS
SYSTOLIC BLOOD PRESSURE: 124 MMHG | HEART RATE: 61 BPM | TEMPERATURE: 97.6 F | WEIGHT: 176.2 LBS | BODY MASS INDEX: 27.65 KG/M2 | HEIGHT: 67 IN | DIASTOLIC BLOOD PRESSURE: 80 MMHG | OXYGEN SATURATION: 96 %

## 2025-05-21 DIAGNOSIS — F33.42 RECURRENT MAJOR DEPRESSIVE DISORDER, IN FULL REMISSION (HCC): ICD-10-CM

## 2025-05-21 DIAGNOSIS — Z86.73 HISTORY OF STROKE: ICD-10-CM

## 2025-05-21 DIAGNOSIS — Z12.31 ENCOUNTER FOR SCREENING MAMMOGRAM FOR MALIGNANT NEOPLASM OF BREAST: ICD-10-CM

## 2025-05-21 DIAGNOSIS — Z12.4 SCREENING FOR CERVICAL CANCER: ICD-10-CM

## 2025-05-21 DIAGNOSIS — Z12.11 COLON CANCER SCREENING: ICD-10-CM

## 2025-05-21 DIAGNOSIS — Z00.00 WELL ADULT EXAM: Primary | ICD-10-CM

## 2025-05-21 DIAGNOSIS — I10 ESSENTIAL HYPERTENSION: ICD-10-CM

## 2025-05-21 PROCEDURE — 99396 PREV VISIT EST AGE 40-64: CPT | Performed by: FAMILY MEDICINE

## 2025-05-21 RX ORDER — VENLAFAXINE 37.5 MG/1
37.5 TABLET ORAL DAILY
Qty: 90 TABLET | Refills: 3 | Status: SHIPPED | OUTPATIENT
Start: 2025-05-21

## 2025-05-21 NOTE — ASSESSMENT & PLAN NOTE
No evidence of recurrence.  Continue losartan.  Orders:    CBC and differential    Comprehensive metabolic panel    Lipid Panel with Direct LDL reflex; Future

## 2025-05-21 NOTE — PROGRESS NOTES
Name: Radha Garcia      : 1960      MRN: [id number]  Encounter Provider: Lenin Elder DO  Encounter Date: 2025   Encounter department: [facility]  :  Assessment & Plan  Well adult exam  Anticipatory guidance provided.  Recommend recheck again in 1 year.  Sooner if needed.       History of stroke  No evidence of recurrence.  Continue losartan.  Orders:    CBC and differential    Comprehensive metabolic panel    Lipid Panel with Direct LDL reflex; Future    Essential hypertension  Continue good blood pressure management.  Orders:    CBC and differential    Comprehensive metabolic panel    Lipid Panel with Direct LDL reflex; Future    Screening for cervical cancer    Orders:    Ambulatory referral to Obstetrics / Gynecology; Future    Colon cancer screening  Recommend Cologuard testing every 3 years or colonoscopy every 10 years  Orders:    Cologuard    Encounter for screening mammogram for malignant neoplasm of breast  Recommend annual mammography.  Orders:    Mammo screening bilateral w 3d and cad; Future    Recurrent major depressive disorder, in full remission (HCC)  Depression Screening Follow-up Plan: Patient's depression screening was positive with a PHQ-9 score of 6. Patient with underlying depression and was advised to continue current medications as prescribed.    Orders:    venlafaxine (EFFEXOR) 37.5 mg tablet; Take 1 tablet (37.5 mg total) by mouth daily      Recommend good diet and regular exercise.  Anticipatory guidance provided.  Recheck again in 1 year.    Depression Screening and Follow-up Plan: Patient's depression screening was positive with a PHQ-9 score of 6.   Patient with underlying depression and was advised to continue current medications as prescribed.       History of Present Illness   Patient is seen for well check.  She has history of stroke but has recovered fully.  She has no weakness or difficulty with speaking or swallowing.  No recent falls.  She  "is due to follow-up with her gynecologist regularly.  She is also due for mammography.  No new concerns today.      Review of Systems   Constitutional: Negative.    HENT: Negative.     Eyes: Negative.    Respiratory: Negative.     Cardiovascular: Negative.    Gastrointestinal: Negative.    Endocrine: Negative.    Genitourinary: Negative.    Musculoskeletal: Negative.    Skin: Negative.    Allergic/Immunologic: Negative.    Neurological: Negative.    Hematological: Negative.    Psychiatric/Behavioral: Negative.         Objective   /80 (BP Location: Left arm, Patient Position: Sitting, Cuff Size: Standard)   Pulse 61   Temp 97.6 °F (36.4 °C) (Tympanic)   Ht 5' 7\" (1.702 m)   Wt 79.9 kg (176 lb 3.2 oz)   SpO2 96%   BMI 27.60 kg/m²      Physical Exam  Constitutional:       General: She is not in acute distress.     Appearance: She is well-developed. She is not diaphoretic.   HENT:      Head: Normocephalic and atraumatic.      Right Ear: External ear normal.      Left Ear: External ear normal.      Nose: Nose normal.      Mouth/Throat:      Pharynx: Oropharynx is clear.     Eyes:      General: No scleral icterus.        Right eye: No discharge.         Left eye: No discharge.      Conjunctiva/sclera: Conjunctivae normal.      Pupils: Pupils are equal, round, and reactive to light.     Neck:      Thyroid: No thyromegaly.      Vascular: No JVD.      Trachea: No tracheal deviation.     Cardiovascular:      Rate and Rhythm: Normal rate and regular rhythm.      Heart sounds: Normal heart sounds. No murmur heard.     No friction rub. No gallop.   Pulmonary:      Effort: Pulmonary effort is normal. No respiratory distress.      Breath sounds: Normal breath sounds. No wheezing or rales.   Chest:      Chest wall: No tenderness.   Abdominal:      General: Bowel sounds are normal. There is no distension.      Palpations: Abdomen is soft. There is no mass.      Tenderness: There is no abdominal tenderness. There is no " guarding or rebound.      Hernia: No hernia is present.     Musculoskeletal:         General: No tenderness or deformity. Normal range of motion.      Cervical back: Normal range of motion and neck supple.   Lymphadenopathy:      Cervical: No cervical adenopathy.     Skin:     General: Skin is warm and dry.      Capillary Refill: Capillary refill takes less than 2 seconds.      Coloration: Skin is not pale.      Findings: No erythema or rash.     Neurological:      Mental Status: She is alert and oriented to person, place, and time.      Cranial Nerves: No cranial nerve deficit.      Sensory: No sensory deficit.      Motor: No abnormal muscle tone.      Coordination: Coordination normal.      Deep Tendon Reflexes: Reflexes normal.     Psychiatric:         Mood and Affect: Mood normal.         Behavior: Behavior normal.         Answers submitted by the patient for this visit:  Annual Physical (Submitted on 5/20/2025)  Diet/Nutrition choices: low carb diet, consuming 3-5 servings of fruits/vegetables daily  Exercise choices: walking, 1-2 times a week on average, 30-60 minutes on average  Sleep choices: sleeps well, > 8 hours of sleep on average  Hearing choices: normal hearing bilateral ears, tinnitus  Vision choices: vision problems, most recent eye exam < 1 year ago, wears glasses  Dental choices: regular dental visits, brushes teeth once daily, floss regularly  Do you currently have an OB/GYN provider that you routinely follow with?: No  Menopausal status: postmenopausal  Any history of sexual transmitted disease/infection?: No  Contraception: menopause  Do you have an advance directive/living will?: No  Do you have a durable power of  (POA)?: Yes

## 2025-05-21 NOTE — ASSESSMENT & PLAN NOTE
Continue good blood pressure management.  Orders:    CBC and differential    Comprehensive metabolic panel    Lipid Panel with Direct LDL reflex; Future

## 2025-06-03 DIAGNOSIS — Z00.00 HEALTH CARE MAINTENANCE: ICD-10-CM

## 2025-06-03 RX ORDER — VENLAFAXINE 75 MG/1
75 TABLET ORAL DAILY
Qty: 90 TABLET | Refills: 0 | OUTPATIENT
Start: 2025-06-03

## 2025-06-17 ENCOUNTER — VBI (OUTPATIENT)
Dept: ADMINISTRATIVE | Facility: OTHER | Age: 65
End: 2025-06-17